# Patient Record
Sex: MALE | HISPANIC OR LATINO | URBAN - METROPOLITAN AREA
[De-identification: names, ages, dates, MRNs, and addresses within clinical notes are randomized per-mention and may not be internally consistent; named-entity substitution may affect disease eponyms.]

---

## 2017-03-25 ENCOUNTER — EMERGENCY (EMERGENCY)
Facility: HOSPITAL | Age: 27
LOS: 1 days | Discharge: HOME | End: 2017-03-25
Attending: EMERGENCY MEDICINE
Payer: SELF-PAY

## 2017-03-25 VITALS
HEIGHT: 65 IN | WEIGHT: 179.9 LBS | OXYGEN SATURATION: 96 % | RESPIRATION RATE: 20 BRPM | SYSTOLIC BLOOD PRESSURE: 134 MMHG | DIASTOLIC BLOOD PRESSURE: 81 MMHG | HEART RATE: 108 BPM | TEMPERATURE: 98 F

## 2017-03-25 VITALS
RESPIRATION RATE: 20 BRPM | SYSTOLIC BLOOD PRESSURE: 104 MMHG | OXYGEN SATURATION: 99 % | TEMPERATURE: 98 F | HEART RATE: 97 BPM | DIASTOLIC BLOOD PRESSURE: 68 MMHG

## 2017-03-25 DIAGNOSIS — R19.7 DIARRHEA, UNSPECIFIED: ICD-10-CM

## 2017-03-25 DIAGNOSIS — R10.84 GENERALIZED ABDOMINAL PAIN: ICD-10-CM

## 2017-03-25 DIAGNOSIS — F41.9 ANXIETY DISORDER, UNSPECIFIED: ICD-10-CM

## 2017-03-25 DIAGNOSIS — R07.89 OTHER CHEST PAIN: ICD-10-CM

## 2017-03-25 DIAGNOSIS — R11.2 NAUSEA WITH VOMITING, UNSPECIFIED: ICD-10-CM

## 2017-03-25 LAB
ALBUMIN SERPL ELPH-MCNC: 4.5 G/DL — SIGNIFICANT CHANGE UP (ref 3.3–5.2)
ALP SERPL-CCNC: 65 U/L — SIGNIFICANT CHANGE UP (ref 40–120)
ALT FLD-CCNC: 102 U/L — HIGH
ANION GAP SERPL CALC-SCNC: 13 MMOL/L — SIGNIFICANT CHANGE UP (ref 5–17)
AST SERPL-CCNC: 38 U/L — SIGNIFICANT CHANGE UP
BILIRUB SERPL-MCNC: 1.3 MG/DL — SIGNIFICANT CHANGE UP (ref 0.4–2)
BUN SERPL-MCNC: 12 MG/DL — SIGNIFICANT CHANGE UP (ref 8–20)
CALCIUM SERPL-MCNC: 8.7 MG/DL — SIGNIFICANT CHANGE UP (ref 8.6–10.2)
CHLORIDE SERPL-SCNC: 99 MMOL/L — SIGNIFICANT CHANGE UP (ref 98–107)
CK SERPL-CCNC: 85 U/L — SIGNIFICANT CHANGE UP (ref 30–200)
CO2 SERPL-SCNC: 27 MMOL/L — SIGNIFICANT CHANGE UP (ref 22–29)
CREAT SERPL-MCNC: 0.8 MG/DL — SIGNIFICANT CHANGE UP (ref 0.5–1.3)
EOSINOPHIL # BLD AUTO: 0.1 K/UL — SIGNIFICANT CHANGE UP (ref 0–0.5)
EOSINOPHIL NFR BLD AUTO: 0 % — SIGNIFICANT CHANGE UP (ref 0–5)
GLUCOSE SERPL-MCNC: 111 MG/DL — SIGNIFICANT CHANGE UP (ref 70–115)
HCT VFR BLD CALC: 47.4 % — SIGNIFICANT CHANGE UP (ref 42–52)
HGB BLD-MCNC: 16.1 G/DL — SIGNIFICANT CHANGE UP (ref 14–18)
LYMPHOCYTES # BLD AUTO: 0.7 K/UL — LOW (ref 1–4.8)
LYMPHOCYTES # BLD AUTO: 4 % — LOW (ref 20–55)
MCHC RBC-ENTMCNC: 31.8 PG — HIGH (ref 27–31)
MCHC RBC-ENTMCNC: 34 G/DL — SIGNIFICANT CHANGE UP (ref 32–36)
MCV RBC AUTO: 93.5 FL — SIGNIFICANT CHANGE UP (ref 80–94)
METAMYELOCYTES # FLD: 1 % — HIGH (ref 0–0)
MONOCYTES # BLD AUTO: 0.5 K/UL — SIGNIFICANT CHANGE UP (ref 0–0.8)
MONOCYTES NFR BLD AUTO: 3 % — SIGNIFICANT CHANGE UP (ref 3–10)
NEUTROPHILS # BLD AUTO: 12.4 K/UL — HIGH (ref 1.8–8)
NEUTROPHILS NFR BLD AUTO: 84 % — HIGH (ref 37–73)
NEUTS BAND # BLD: 8 % — SIGNIFICANT CHANGE UP (ref 0–8)
PLAT MORPH BLD: NORMAL — SIGNIFICANT CHANGE UP
PLATELET # BLD AUTO: 277 K/UL — SIGNIFICANT CHANGE UP (ref 150–400)
POTASSIUM SERPL-MCNC: 4.2 MMOL/L — SIGNIFICANT CHANGE UP (ref 3.5–5.3)
POTASSIUM SERPL-SCNC: 4.2 MMOL/L — SIGNIFICANT CHANGE UP (ref 3.5–5.3)
PROT SERPL-MCNC: 8.1 G/DL — SIGNIFICANT CHANGE UP (ref 6.6–8.7)
RBC # BLD: 5.07 M/UL — SIGNIFICANT CHANGE UP (ref 4.6–6.2)
RBC # FLD: 12.9 % — SIGNIFICANT CHANGE UP (ref 11–15.6)
RBC BLD AUTO: NORMAL — SIGNIFICANT CHANGE UP
SODIUM SERPL-SCNC: 139 MMOL/L — SIGNIFICANT CHANGE UP (ref 135–145)
TROPONIN T SERPL-MCNC: <0.01 NG/ML — SIGNIFICANT CHANGE UP (ref 0–0.06)
WBC # BLD: 13.8 K/UL — HIGH (ref 4.8–10.8)
WBC # FLD AUTO: 13.8 K/UL — HIGH (ref 4.8–10.8)

## 2017-03-25 PROCEDURE — 85027 COMPLETE CBC AUTOMATED: CPT

## 2017-03-25 PROCEDURE — 99053 MED SERV 10PM-8AM 24 HR FAC: CPT

## 2017-03-25 PROCEDURE — 93010 ELECTROCARDIOGRAM REPORT: CPT

## 2017-03-25 PROCEDURE — 84484 ASSAY OF TROPONIN QUANT: CPT

## 2017-03-25 PROCEDURE — 93005 ELECTROCARDIOGRAM TRACING: CPT

## 2017-03-25 PROCEDURE — 80053 COMPREHEN METABOLIC PANEL: CPT

## 2017-03-25 PROCEDURE — 99284 EMERGENCY DEPT VISIT MOD MDM: CPT | Mod: 25

## 2017-03-25 PROCEDURE — 99285 EMERGENCY DEPT VISIT HI MDM: CPT | Mod: 25

## 2017-03-25 PROCEDURE — 82550 ASSAY OF CK (CPK): CPT

## 2017-03-25 RX ORDER — ALPRAZOLAM 0.25 MG
0.25 TABLET ORAL ONCE
Qty: 0 | Refills: 0 | Status: DISCONTINUED | OUTPATIENT
Start: 2017-03-25 | End: 2017-03-25

## 2017-03-25 RX ORDER — FAMOTIDINE 10 MG/ML
20 INJECTION INTRAVENOUS ONCE
Qty: 0 | Refills: 0 | Status: COMPLETED | OUTPATIENT
Start: 2017-03-25 | End: 2017-03-25

## 2017-03-25 RX ORDER — ONDANSETRON 8 MG/1
4 TABLET, FILM COATED ORAL ONCE
Qty: 0 | Refills: 0 | Status: COMPLETED | OUTPATIENT
Start: 2017-03-25 | End: 2017-03-25

## 2017-03-25 RX ORDER — SODIUM CHLORIDE 9 MG/ML
1000 INJECTION INTRAMUSCULAR; INTRAVENOUS; SUBCUTANEOUS ONCE
Qty: 0 | Refills: 0 | Status: COMPLETED | OUTPATIENT
Start: 2017-03-25 | End: 2017-03-25

## 2017-03-25 RX ORDER — LIDOCAINE 4 G/100G
10 CREAM TOPICAL ONCE
Qty: 0 | Refills: 0 | Status: COMPLETED | OUTPATIENT
Start: 2017-03-25 | End: 2017-03-25

## 2017-03-25 RX ADMIN — FAMOTIDINE 20 MILLIGRAM(S): 10 INJECTION INTRAVENOUS at 08:14

## 2017-03-25 RX ADMIN — LIDOCAINE 10 MILLILITER(S): 4 CREAM TOPICAL at 08:13

## 2017-03-25 RX ADMIN — Medication 30 MILLILITER(S): at 08:13

## 2017-03-25 RX ADMIN — SODIUM CHLORIDE 1000 MILLILITER(S): 9 INJECTION INTRAMUSCULAR; INTRAVENOUS; SUBCUTANEOUS at 10:46

## 2017-03-25 RX ADMIN — Medication 0.25 MILLIGRAM(S): at 10:49

## 2017-03-25 RX ADMIN — ONDANSETRON 4 MILLIGRAM(S): 8 TABLET, FILM COATED ORAL at 08:16

## 2017-03-25 NOTE — ED PROVIDER NOTE - MEDICAL DECISION MAKING DETAILS
25 yo male with no pmhx presenting with acute onset of vomiting and diarrhea. Total of 5-6 episodes of vomiting and 1 episode of diarrhea. PO trial, Zofran for nausea. will re-evaluate as necessary.

## 2017-03-25 NOTE — ED PROVIDER NOTE - NS ED ATTENDING STATEMENT MOD
I have reviewed and agree with all pertinent clinical information, including history and physical exam and agree with treatment plan of the NP. I have personally performed a face to face diagnostic evaluation on this patient. I have reviewed the NP note and agree with the history, exam, and plan of care, except as noted.

## 2017-03-25 NOTE — ED ADULT NURSE NOTE - OBJECTIVE STATEMENT
AOX3 c/o abdominal pain that strated yesterday, pt states it was a sudden unset. pt c/o n/v, vomited 5/6 times last night. Denies any blood in the vomit, diarrhea.

## 2017-03-25 NOTE — ED PROVIDER NOTE - OBJECTIVE STATEMENT
25 yo male with no significant pmhx was well until last night. ate leftover cold  food including cooked chicken, beans and rice that was cooked later in the day and refrigerated. Pt ate at around 11pm and then went to bed At 12 am pt woke up with abdominal (points to epigastric region) pain, nausea, and feeling feverish/chills . pt then vomited and had 1 episode of diarrhea concurrently, and a total of 3-4 episodes of diarrhea. No one else who ate the meal has/had experienced the same symptoms.   Total of 5-6 episodes of vomiting in total since onset of symptoms. Pt tried drink tea earlier this morning but was unable to hold it down.   Denies any recent sick contacts, blood in stool, hx of abdominal disorders or surgeries. No radiation. Pt tried drinking vitamin water and took tums with only mild relief.

## 2017-03-25 NOTE — ED PROVIDER NOTE - ATTENDING CONTRIBUTION TO CARE
agree with NP Kranthi BOWEN/JAK.  pt with vomiting since last night after eating Mexican food.  pt well appearing.  will give meds, PO challenge and reeval

## 2017-03-25 NOTE — ED ADULT TRIAGE NOTE - CHIEF COMPLAINT QUOTE
Patient arrived to ED today with c/o abdominal pain and vomiting about 6 times in the past 12 hours.

## 2017-03-25 NOTE — ED PROVIDER NOTE - PROGRESS NOTE DETAILS
discussed po trial vs labs IV with patient. pt agrees to oral medications and hydration. pt remains nauseous after medications. will order basic labs, fluids, re-eval pt feeling slightly improved. beginning to sip water. will re-eval, if remains nauseous, will order basic labs, fluids, re-eval pt began to feel palpitations and chest discomfort feeling slightly short of breath. EKG ordered along with basic labs, cardiac enzymes, fluids, re-eval. pt began to feel palpitations and chest discomfort feeling slightly short of breath. EKG ordered along with basic labs, cardiac enzymes, fluids, re-eval.  History: Slightly suspicious =   0, EKG: Non-specific repolarization disturbance  +1  Age: < 45 =     0, Risk factors (Risk factors include: hypercholesterolemia, hypertension, diabetes mellitus, cigarette smoking, family history, obesity): No known risk factors  =  0, Troponin: normal limit = 0 pt feels improved. will dc.

## 2017-03-25 NOTE — ED PROVIDER NOTE - CARE PLAN
Principal Discharge DX:	Vomiting Principal Discharge DX:	Vomiting  Secondary Diagnosis:	Chest discomfort  Secondary Diagnosis:	Anxiety

## 2017-05-22 NOTE — ED PROVIDER NOTE - TIMING
----- Message from Fátima Hayden sent at 5/22/2017  4:36 PM CDT -----  Patient needs to reschedule his appointment on 6/23/17. Please call with availability at [phone].   sudden onset

## 2017-06-15 ENCOUNTER — EMERGENCY (EMERGENCY)
Facility: HOSPITAL | Age: 27
LOS: 1 days | Discharge: HOME | End: 2017-06-15
Attending: EMERGENCY MEDICINE
Payer: SELF-PAY

## 2017-06-15 VITALS
TEMPERATURE: 98 F | DIASTOLIC BLOOD PRESSURE: 82 MMHG | WEIGHT: 195.99 LBS | RESPIRATION RATE: 18 BRPM | SYSTOLIC BLOOD PRESSURE: 122 MMHG | HEART RATE: 84 BPM | OXYGEN SATURATION: 100 % | HEIGHT: 65 IN

## 2017-06-15 PROCEDURE — 99284 EMERGENCY DEPT VISIT MOD MDM: CPT

## 2017-06-15 PROCEDURE — 99285 EMERGENCY DEPT VISIT HI MDM: CPT

## 2017-06-15 RX ORDER — FAMOTIDINE 10 MG/ML
20 INJECTION INTRAVENOUS ONCE
Qty: 0 | Refills: 0 | Status: COMPLETED | OUTPATIENT
Start: 2017-06-15 | End: 2017-06-15

## 2017-06-15 RX ORDER — SODIUM CHLORIDE 9 MG/ML
1000 INJECTION INTRAMUSCULAR; INTRAVENOUS; SUBCUTANEOUS ONCE
Qty: 0 | Refills: 0 | Status: COMPLETED | OUTPATIENT
Start: 2017-06-15 | End: 2017-06-15

## 2017-06-15 RX ORDER — ONDANSETRON 8 MG/1
4 TABLET, FILM COATED ORAL ONCE
Qty: 0 | Refills: 0 | Status: COMPLETED | OUTPATIENT
Start: 2017-06-15 | End: 2017-06-15

## 2017-06-15 NOTE — ED PROVIDER NOTE - CONSTITUTIONAL, MLM
normal... Slightly uncoomfortable appearing, well nourished, awake, alert, oriented to person, place, time/situation and in no apparent distress.

## 2017-06-15 NOTE — ED PROVIDER NOTE - PROGRESS NOTE DETAILS
pt was seen and evaluated by surgery who cleared for d/c home after successful po challenge.  pt ate some cookies and requesting to go home.

## 2017-06-15 NOTE — ED PROVIDER NOTE - OBJECTIVE STATEMENT
25 y/o M presents to the ED c/o epigastric abdominal pain and vomiting that began this morning. He reports that his pain radiates up to his chest alongside a burning sensation. Pt states that he experienced similar sx recently, was evaluated at [facility] and dx with food poisoning. Denies fever, chills, diarrhea, consuming new/spoiled food. No further complaints at this time.

## 2017-06-16 VITALS
OXYGEN SATURATION: 98 % | DIASTOLIC BLOOD PRESSURE: 84 MMHG | RESPIRATION RATE: 16 BRPM | HEART RATE: 77 BPM | SYSTOLIC BLOOD PRESSURE: 136 MMHG | TEMPERATURE: 98 F

## 2017-06-16 DIAGNOSIS — K80.20 CALCULUS OF GALLBLADDER WITHOUT CHOLECYSTITIS WITHOUT OBSTRUCTION: ICD-10-CM

## 2017-06-16 LAB
ALBUMIN SERPL ELPH-MCNC: 4.6 G/DL — SIGNIFICANT CHANGE UP (ref 3.3–5.2)
ALP SERPL-CCNC: 64 U/L — SIGNIFICANT CHANGE UP (ref 40–120)
ALT FLD-CCNC: 115 U/L — HIGH
AMYLASE P1 CFR SERPL: 75 U/L — SIGNIFICANT CHANGE UP (ref 36–128)
ANION GAP SERPL CALC-SCNC: 14 MMOL/L — SIGNIFICANT CHANGE UP (ref 5–17)
AST SERPL-CCNC: 54 U/L — HIGH
BASOPHILS # BLD AUTO: 0 K/UL — SIGNIFICANT CHANGE UP (ref 0–0.2)
BASOPHILS NFR BLD AUTO: 0.1 % — SIGNIFICANT CHANGE UP (ref 0–2)
BILIRUB SERPL-MCNC: 1.2 MG/DL — SIGNIFICANT CHANGE UP (ref 0.4–2)
BUN SERPL-MCNC: 8 MG/DL — SIGNIFICANT CHANGE UP (ref 8–20)
CALCIUM SERPL-MCNC: 8.9 MG/DL — SIGNIFICANT CHANGE UP (ref 8.6–10.2)
CHLORIDE SERPL-SCNC: 99 MMOL/L — SIGNIFICANT CHANGE UP (ref 98–107)
CO2 SERPL-SCNC: 24 MMOL/L — SIGNIFICANT CHANGE UP (ref 22–29)
CREAT SERPL-MCNC: 0.66 MG/DL — SIGNIFICANT CHANGE UP (ref 0.5–1.3)
EOSINOPHIL # BLD AUTO: 0 K/UL — SIGNIFICANT CHANGE UP (ref 0–0.5)
EOSINOPHIL NFR BLD AUTO: 0.1 % — SIGNIFICANT CHANGE UP (ref 0–5)
GLUCOSE SERPL-MCNC: 114 MG/DL — SIGNIFICANT CHANGE UP (ref 70–115)
HCT VFR BLD CALC: 44.1 % — SIGNIFICANT CHANGE UP (ref 42–52)
HGB BLD-MCNC: 15.5 G/DL — SIGNIFICANT CHANGE UP (ref 14–18)
LIDOCAIN IGE QN: 47 U/L — SIGNIFICANT CHANGE UP (ref 22–51)
LYMPHOCYTES # BLD AUTO: 1.2 K/UL — SIGNIFICANT CHANGE UP (ref 1–4.8)
LYMPHOCYTES # BLD AUTO: 9.7 % — LOW (ref 20–55)
MCHC RBC-ENTMCNC: 31.7 PG — HIGH (ref 27–31)
MCHC RBC-ENTMCNC: 35.1 G/DL — SIGNIFICANT CHANGE UP (ref 32–36)
MCV RBC AUTO: 90.2 FL — SIGNIFICANT CHANGE UP (ref 80–94)
MONOCYTES # BLD AUTO: 0.4 K/UL — SIGNIFICANT CHANGE UP (ref 0–0.8)
MONOCYTES NFR BLD AUTO: 3.6 % — SIGNIFICANT CHANGE UP (ref 3–10)
NEUTROPHILS # BLD AUTO: 10.6 K/UL — HIGH (ref 1.8–8)
NEUTROPHILS NFR BLD AUTO: 86.3 % — HIGH (ref 37–73)
PLATELET # BLD AUTO: 275 K/UL — SIGNIFICANT CHANGE UP (ref 150–400)
POTASSIUM SERPL-MCNC: 3.9 MMOL/L — SIGNIFICANT CHANGE UP (ref 3.5–5.3)
POTASSIUM SERPL-SCNC: 3.9 MMOL/L — SIGNIFICANT CHANGE UP (ref 3.5–5.3)
PROT SERPL-MCNC: 8 G/DL — SIGNIFICANT CHANGE UP (ref 6.6–8.7)
RBC # BLD: 4.89 M/UL — SIGNIFICANT CHANGE UP (ref 4.6–6.2)
RBC # FLD: 12.6 % — SIGNIFICANT CHANGE UP (ref 11–15.6)
SODIUM SERPL-SCNC: 137 MMOL/L — SIGNIFICANT CHANGE UP (ref 135–145)
WBC # BLD: 12.3 K/UL — HIGH (ref 4.8–10.8)
WBC # FLD AUTO: 12.3 K/UL — HIGH (ref 4.8–10.8)

## 2017-06-16 PROCEDURE — 96374 THER/PROPH/DIAG INJ IV PUSH: CPT

## 2017-06-16 PROCEDURE — 99284 EMERGENCY DEPT VISIT MOD MDM: CPT | Mod: 25

## 2017-06-16 PROCEDURE — 76705 ECHO EXAM OF ABDOMEN: CPT

## 2017-06-16 PROCEDURE — 85027 COMPLETE CBC AUTOMATED: CPT

## 2017-06-16 PROCEDURE — 83690 ASSAY OF LIPASE: CPT

## 2017-06-16 PROCEDURE — 76705 ECHO EXAM OF ABDOMEN: CPT | Mod: 26

## 2017-06-16 PROCEDURE — 96375 TX/PRO/DX INJ NEW DRUG ADDON: CPT

## 2017-06-16 PROCEDURE — 82150 ASSAY OF AMYLASE: CPT

## 2017-06-16 PROCEDURE — 80053 COMPREHEN METABOLIC PANEL: CPT

## 2017-06-16 RX ADMIN — ONDANSETRON 4 MILLIGRAM(S): 8 TABLET, FILM COATED ORAL at 01:30

## 2017-06-16 RX ADMIN — FAMOTIDINE 20 MILLIGRAM(S): 10 INJECTION INTRAVENOUS at 01:30

## 2017-06-16 RX ADMIN — SODIUM CHLORIDE 1000 MILLILITER(S): 9 INJECTION INTRAMUSCULAR; INTRAVENOUS; SUBCUTANEOUS at 01:30

## 2017-06-16 NOTE — CONSULT NOTE ADULT - ASSESSMENT
27 y/o M w/ cholelithiasis. He has leukocytosis w/ left shift, however no pericholecystic fluid or gallbladder wall thickening on ultrasound, CBD is normal (4mm). Currently benign abdominal exam. ALT and AST are mildly elevated, Tbili is normal. He is afebrile and hemodynamically normal.

## 2017-06-16 NOTE — ED ADULT NURSE NOTE - OBJECTIVE STATEMENT
26 yr old male a+ox4 presents to ED c/o general abd pain and nausea.  pt denies vomiting, diarrhea, fevers, chills and sick contacts. no bloody emesis.   hx similar event when he was dx with food poisoning.  pt denies any PMH/PSH.

## 2017-06-16 NOTE — CONSULT NOTE ADULT - SUBJECTIVE AND OBJECTIVE BOX
INTERVAL HPI:  25 y/o M w/ PMH Gastritis comes to the ED w/ c/o vomiting and burning epigastric pain since one day ago. Around 2 pm yesterday he ate chinese food and then this morning he had burning epigastric pain and one vomiting episode. The pain radiates to the back, minimally relieved with TUMS. This is not the first time he has had this pain since he was seen two months ago for same c/o however he also had diarrhea in addition to epigastric burning pain he got from food poisoning. At this moment he denies diarrhea, dysuria, chest pain, palpitations, fevers or chills.     Allergies: NKDA  PMH: Gastritis  PSH: None  SH: Denies toxic habits      Vital Signs Last 24 Hrs  T(C): 36.8, Max: 36.8 (06-15 @ 21:52)  T(F): 98.2, Max: 98.2 (06-15 @ 21:52)  HR: 84 (84 - 84)  BP: 122/82 (122/82 - 122/82)  BP(mean): --  RR: 18 (18 - 18)  SpO2: 100% (100% - 100%)    Physical Exam:    Neurological:  No sensory/motor deficits    HEENT: PERRLA, EOMI, no drainage or redness    Neck: No bruits; no thyromegaly or nodules,  No JVD    Back: Normal spine flexure, No CVA tenderness, No deformity or limitation of movement    Respiratory: Breath Sounds equal & clear to auscultation, no accessory muscle use    Cardiovascular: Regular rate & rhythm, normal S1, S2; no murmurs, gallops or rubs    Gastrointestinal: He is soft, nondistended. Very mild RUQ tenderness. No rebound tenderness. No rigidity. Marrero's sign is negative. No masses palpable.     Extremities: No peripheral edema, No cyanosis, clubbing     Vascular: Equal and normal pulses: 2+ peripheral pulses throughout    Musculoskeletal: No joint pain, swelling or deformity; no limitation of movement    Skin: No rashes      I&O's Detail      LABS:                        15.5   12.3  )-----------( 275      ( 16 Jun 2017 01:46 )             44.1     06-16    137  |  99  |  8.0  ----------------------------<  114  3.9   |  24.0  |  0.66    Ca    8.9      16 Jun 2017 01:46    TPro  8.0  /  Alb  4.6  /  TBili  1.2  /  DBili  x   /  AST  54<H>  /  ALT  115<H>  /  AlkPhos  64  06-16          RADIOLOGY & ADDITIONAL STUDIES:

## 2017-06-16 NOTE — CONSULT NOTE ADULT - PROBLEM SELECTOR RECOMMENDATION 9
-Recommend PO trial, if he does not tolerate, will consider admission for laparoscopic cholecystectomy  -If he tolerates PO, he may follow up w/ [facility] to schedule for elective laparoscopic cholecystectomy

## 2017-07-30 ENCOUNTER — EMERGENCY (EMERGENCY)
Facility: HOSPITAL | Age: 27
LOS: 1 days | Discharge: HOME | End: 2017-07-30
Attending: EMERGENCY MEDICINE
Payer: SELF-PAY

## 2017-07-30 VITALS
HEART RATE: 86 BPM | SYSTOLIC BLOOD PRESSURE: 122 MMHG | WEIGHT: 184.97 LBS | RESPIRATION RATE: 20 BRPM | TEMPERATURE: 99 F | OXYGEN SATURATION: 99 % | DIASTOLIC BLOOD PRESSURE: 72 MMHG

## 2017-07-30 PROCEDURE — 99282 EMERGENCY DEPT VISIT SF MDM: CPT | Mod: 25

## 2017-07-30 PROCEDURE — 69200 CLEAR OUTER EAR CANAL: CPT | Mod: RT

## 2017-07-30 PROCEDURE — 69200 CLEAR OUTER EAR CANAL: CPT

## 2017-07-30 NOTE — ED STATDOCS - OBJECTIVE STATEMENT
28 y/o M pt presents to ED c/o right ear pain/foreign body x10 minutes. Pt reports he was wearing ear phones and when he went to take it out, a piece got stuck in his ear. Pt denies fever, chills, CP, SOB, nausea, vomiting, abdominal pain, headache, and visual changes. No further complaints at this time. NKDA.

## 2017-07-30 NOTE — ED STATDOCS - ENMT, MLM
Ear bud in right ear, removed with tweezers. TM wnl. soft rubber Ear bud cover in right ear, removed with tweezers. TM wnl.

## 2018-03-13 ENCOUNTER — INPATIENT (INPATIENT)
Facility: HOSPITAL | Age: 28
LOS: 2 days | Discharge: HOME | DRG: 872 | End: 2018-03-16
Attending: HOSPITALIST | Admitting: FAMILY MEDICINE
Payer: COMMERCIAL

## 2018-03-13 VITALS
SYSTOLIC BLOOD PRESSURE: 99 MMHG | RESPIRATION RATE: 22 BRPM | DIASTOLIC BLOOD PRESSURE: 60 MMHG | HEIGHT: 65 IN | WEIGHT: 244.93 LBS | OXYGEN SATURATION: 100 % | TEMPERATURE: 101 F | HEART RATE: 148 BPM

## 2018-03-13 LAB
ALBUMIN SERPL ELPH-MCNC: 2.9 G/DL — LOW (ref 3.3–5.2)
ALP SERPL-CCNC: 78 U/L — SIGNIFICANT CHANGE UP (ref 40–120)
ALT FLD-CCNC: 31 U/L — SIGNIFICANT CHANGE UP
ANION GAP SERPL CALC-SCNC: 15 MMOL/L — SIGNIFICANT CHANGE UP (ref 5–17)
ANION GAP SERPL CALC-SCNC: 16 MMOL/L — SIGNIFICANT CHANGE UP (ref 5–17)
APPEARANCE UR: CLEAR — SIGNIFICANT CHANGE UP
APTT BLD: 32.2 SEC — SIGNIFICANT CHANGE UP (ref 27.5–37.4)
AST SERPL-CCNC: 21 U/L — SIGNIFICANT CHANGE UP
BASOPHILS # BLD AUTO: 0 K/UL — SIGNIFICANT CHANGE UP (ref 0–0.2)
BASOPHILS NFR BLD AUTO: 0 % — SIGNIFICANT CHANGE UP (ref 0–2)
BILIRUB SERPL-MCNC: 1 MG/DL — SIGNIFICANT CHANGE UP (ref 0.4–2)
BILIRUB UR-MCNC: NEGATIVE — SIGNIFICANT CHANGE UP
BUN SERPL-MCNC: 11 MG/DL — SIGNIFICANT CHANGE UP (ref 8–20)
BUN SERPL-MCNC: 9 MG/DL — SIGNIFICANT CHANGE UP (ref 8–20)
CALCIUM SERPL-MCNC: 8.2 MG/DL — LOW (ref 8.6–10.2)
CALCIUM SERPL-MCNC: 8.6 MG/DL — SIGNIFICANT CHANGE UP (ref 8.6–10.2)
CHLORIDE SERPL-SCNC: 100 MMOL/L — SIGNIFICANT CHANGE UP (ref 98–107)
CHLORIDE SERPL-SCNC: 94 MMOL/L — LOW (ref 98–107)
CO2 SERPL-SCNC: 20 MMOL/L — LOW (ref 22–29)
CO2 SERPL-SCNC: 22 MMOL/L — SIGNIFICANT CHANGE UP (ref 22–29)
COLOR SPEC: YELLOW — SIGNIFICANT CHANGE UP
CREAT SERPL-MCNC: 0.77 MG/DL — SIGNIFICANT CHANGE UP (ref 0.5–1.3)
CREAT SERPL-MCNC: 0.85 MG/DL — SIGNIFICANT CHANGE UP (ref 0.5–1.3)
DIFF PNL FLD: NEGATIVE — SIGNIFICANT CHANGE UP
EOSINOPHIL # BLD AUTO: 0 K/UL — SIGNIFICANT CHANGE UP (ref 0–0.5)
EOSINOPHIL NFR BLD AUTO: 0 % — SIGNIFICANT CHANGE UP (ref 0–5)
EPI CELLS # UR: SIGNIFICANT CHANGE UP
GAS PNL BLDV: SIGNIFICANT CHANGE UP
GLUCOSE SERPL-MCNC: 86 MG/DL — SIGNIFICANT CHANGE UP (ref 70–115)
GLUCOSE SERPL-MCNC: 99 MG/DL — SIGNIFICANT CHANGE UP (ref 70–115)
GLUCOSE UR QL: NEGATIVE MG/DL — SIGNIFICANT CHANGE UP
HCO3 BLDV-SCNC: 25 MMOL/L — SIGNIFICANT CHANGE UP (ref 21–29)
HCT VFR BLD CALC: 34.9 % — LOW (ref 42–52)
HCT VFR BLD CALC: 36.5 % — LOW (ref 42–52)
HGB BLD-MCNC: 12 G/DL — LOW (ref 14–18)
HGB BLD-MCNC: 12.4 G/DL — LOW (ref 14–18)
INR BLD: 1.47 RATIO — HIGH (ref 0.88–1.16)
KETONES UR-MCNC: ABNORMAL
LACTATE BLDV-MCNC: 1 MMOL/L — SIGNIFICANT CHANGE UP (ref 0.5–2)
LACTATE BLDV-MCNC: 1.4 MMOL/L — SIGNIFICANT CHANGE UP (ref 0.5–2)
LEUKOCYTE ESTERASE UR-ACNC: ABNORMAL
LIDOCAIN IGE QN: 35 U/L — SIGNIFICANT CHANGE UP (ref 22–51)
LYMPHOCYTES # BLD AUTO: 0.2 K/UL — LOW (ref 1–4.8)
LYMPHOCYTES # BLD AUTO: 4 % — LOW (ref 20–55)
MANUAL DIF COMMENT BLD-IMP: SIGNIFICANT CHANGE UP
MCHC RBC-ENTMCNC: 30.7 PG — SIGNIFICANT CHANGE UP (ref 27–31)
MCHC RBC-ENTMCNC: 30.7 PG — SIGNIFICANT CHANGE UP (ref 27–31)
MCHC RBC-ENTMCNC: 34 G/DL — SIGNIFICANT CHANGE UP (ref 32–36)
MCHC RBC-ENTMCNC: 34.4 G/DL — SIGNIFICANT CHANGE UP (ref 32–36)
MCV RBC AUTO: 89.3 FL — SIGNIFICANT CHANGE UP (ref 80–94)
MCV RBC AUTO: 90.3 FL — SIGNIFICANT CHANGE UP (ref 80–94)
MONOCYTES # BLD AUTO: 0.4 K/UL — SIGNIFICANT CHANGE UP (ref 0–0.8)
MONOCYTES NFR BLD AUTO: 4 % — SIGNIFICANT CHANGE UP (ref 3–10)
NEUTROPHILS # BLD AUTO: 14.4 K/UL — HIGH (ref 1.8–8)
NEUTROPHILS NFR BLD AUTO: 83 % — HIGH (ref 37–73)
NEUTS BAND # BLD: 9 % — HIGH (ref 0–8)
NITRITE UR-MCNC: NEGATIVE — SIGNIFICANT CHANGE UP
PCO2 BLDV: 39 MMHG — SIGNIFICANT CHANGE UP (ref 35–50)
PH BLDV: 7.42 — SIGNIFICANT CHANGE UP (ref 7.32–7.43)
PH UR: 6 — SIGNIFICANT CHANGE UP (ref 5–8)
PLAT MORPH BLD: NORMAL — SIGNIFICANT CHANGE UP
PLATELET # BLD AUTO: 272 K/UL — SIGNIFICANT CHANGE UP (ref 150–400)
PLATELET # BLD AUTO: 300 K/UL — SIGNIFICANT CHANGE UP (ref 150–400)
PO2 BLDV: 51 MMHG — HIGH (ref 25–45)
POTASSIUM SERPL-MCNC: 3.8 MMOL/L — SIGNIFICANT CHANGE UP (ref 3.5–5.3)
POTASSIUM SERPL-MCNC: 4.3 MMOL/L — SIGNIFICANT CHANGE UP (ref 3.5–5.3)
POTASSIUM SERPL-SCNC: 3.8 MMOL/L — SIGNIFICANT CHANGE UP (ref 3.5–5.3)
POTASSIUM SERPL-SCNC: 4.3 MMOL/L — SIGNIFICANT CHANGE UP (ref 3.5–5.3)
PROT SERPL-MCNC: 6.4 G/DL — LOW (ref 6.6–8.7)
PROT UR-MCNC: 30 MG/DL
PROTHROM AB SERPL-ACNC: 16.3 SEC — HIGH (ref 9.8–12.7)
RAPID RVP RESULT: SIGNIFICANT CHANGE UP
RBC # BLD: 3.91 M/UL — LOW (ref 4.6–6.2)
RBC # BLD: 4.04 M/UL — LOW (ref 4.6–6.2)
RBC # FLD: 11.8 % — SIGNIFICANT CHANGE UP (ref 11–15.6)
RBC # FLD: 12.1 % — SIGNIFICANT CHANGE UP (ref 11–15.6)
RBC BLD AUTO: SIGNIFICANT CHANGE UP
S PYO AG SPEC QL IA: NEGATIVE — SIGNIFICANT CHANGE UP
SAO2 % BLDV: 87 % — SIGNIFICANT CHANGE UP
SODIUM SERPL-SCNC: 131 MMOL/L — LOW (ref 135–145)
SODIUM SERPL-SCNC: 136 MMOL/L — SIGNIFICANT CHANGE UP (ref 135–145)
SP GR SPEC: 1.01 — SIGNIFICANT CHANGE UP (ref 1.01–1.02)
UROBILINOGEN FLD QL: 4 MG/DL
WBC # BLD: 15.2 K/UL — HIGH (ref 4.8–10.8)
WBC # BLD: 8.6 K/UL — SIGNIFICANT CHANGE UP (ref 4.8–10.8)
WBC # FLD AUTO: 15.2 K/UL — HIGH (ref 4.8–10.8)
WBC # FLD AUTO: 8.6 K/UL — SIGNIFICANT CHANGE UP (ref 4.8–10.8)
WBC UR QL: SIGNIFICANT CHANGE UP

## 2018-03-13 PROCEDURE — 71045 X-RAY EXAM CHEST 1 VIEW: CPT | Mod: 26

## 2018-03-13 PROCEDURE — 74177 CT ABD & PELVIS W/CONTRAST: CPT | Mod: 26

## 2018-03-13 PROCEDURE — 99284 EMERGENCY DEPT VISIT MOD MDM: CPT

## 2018-03-13 PROCEDURE — 71250 CT THORAX DX C-: CPT | Mod: 26

## 2018-03-13 PROCEDURE — 76705 ECHO EXAM OF ABDOMEN: CPT | Mod: 26

## 2018-03-13 RX ORDER — CEFTRIAXONE 500 MG/1
1 INJECTION, POWDER, FOR SOLUTION INTRAMUSCULAR; INTRAVENOUS ONCE
Qty: 0 | Refills: 0 | Status: COMPLETED | OUTPATIENT
Start: 2018-03-13 | End: 2018-03-13

## 2018-03-13 RX ORDER — ACETAMINOPHEN 500 MG
650 TABLET ORAL ONCE
Qty: 0 | Refills: 0 | Status: COMPLETED | OUTPATIENT
Start: 2018-03-13 | End: 2018-03-13

## 2018-03-13 RX ORDER — SODIUM CHLORIDE 9 MG/ML
3000 INJECTION, SOLUTION INTRAVENOUS ONCE
Qty: 0 | Refills: 0 | Status: COMPLETED | OUTPATIENT
Start: 2018-03-13 | End: 2018-03-13

## 2018-03-13 RX ORDER — SODIUM CHLORIDE 9 MG/ML
3 INJECTION INTRAMUSCULAR; INTRAVENOUS; SUBCUTANEOUS ONCE
Qty: 0 | Refills: 0 | Status: COMPLETED | OUTPATIENT
Start: 2018-03-13 | End: 2018-03-13

## 2018-03-13 RX ORDER — IBUPROFEN 200 MG
600 TABLET ORAL ONCE
Qty: 0 | Refills: 0 | Status: COMPLETED | OUTPATIENT
Start: 2018-03-13 | End: 2018-03-13

## 2018-03-13 RX ADMIN — Medication 600 MILLIGRAM(S): at 17:21

## 2018-03-13 RX ADMIN — Medication 650 MILLIGRAM(S): at 17:21

## 2018-03-13 RX ADMIN — Medication 650 MILLIGRAM(S): at 11:57

## 2018-03-13 RX ADMIN — CEFTRIAXONE 100 GRAM(S): 500 INJECTION, POWDER, FOR SOLUTION INTRAMUSCULAR; INTRAVENOUS at 17:21

## 2018-03-13 RX ADMIN — Medication 650 MILLIGRAM(S): at 21:36

## 2018-03-13 RX ADMIN — SODIUM CHLORIDE 3 MILLILITER(S): 9 INJECTION INTRAMUSCULAR; INTRAVENOUS; SUBCUTANEOUS at 11:57

## 2018-03-13 RX ADMIN — SODIUM CHLORIDE 1500 MILLILITER(S): 9 INJECTION, SOLUTION INTRAVENOUS at 11:56

## 2018-03-13 NOTE — ED ADULT NURSE NOTE - OBJECTIVE STATEMENT
Assumed pt care at 1200.  Pt awake, alert and oriented x3 c/o palpitations and Tmax 104 at home wild sporadic cough.  Pt also c/o nausea when he eats, states he was recently diagnosed with gall stones but is unsure of what plan is regarding stones.  Respirations even and unlabored.  Denies chest pain.  Skin warm and dry.  Moving all extremities well and with purpose.  Sinus tach on cardiac monitor.

## 2018-03-13 NOTE — ED PROVIDER NOTE - PROGRESS NOTE DETAILS
PT WAS TO BE DISCHARGED AFTER IVF. FATHER CONCERNED BECAUSE PT IS SHAKING. WILL CHECK RECTAL TEMP AND SEND OFF RAPID STREP TEST. CXR AND URINE NEGATIVE FOR INFECTION. NEGATIVE FLU. NORMAL LABS NEGATIVE STREP TEST.  WILL DRAW CULTURES AND LACTATE   WILL GIVE IV AB. WILL TREAT FEVER. IV F AT 30 CC/KG ALREADY ORDERED SO CODE SEPSIS NOT CALLED OVER HEAD. CT CHEST , ABD AND PELVIS ORDERED TO DISCOVER ANY OCCULT INFECTION  PCP IS DR [facility] CONSULT CALLED  AWAITING CB FROM ATTENDING  DISCUSSED W/PA painc ontrolled questions answered called surgical consult regarding possibility cholecystitis and there input on case aware bp 90/50 mentation nml nml perfusion in nad will continue to follow closely

## 2018-03-13 NOTE — CONSULT NOTE ADULT - SUBJECTIVE AND OBJECTIVE BOX
Urology Attending:  Jonna       HPI:  27 year old male with no significant past medical history presented to the ED complaining of palpitations and RUQ pain. Patient was febrile to 105 on exam and CTAP demonstrated a fluid collection within the prostate gland measuring 3.2 x 2.4   cm. The prostate gland is enlarged. We were consulted due to suspicion of a prostatic abscess. Patient states he has not had any difficulty voiding, states his bm are regular and last movement was yesterday. He denies penile discharge, pain with urination, denies sexual activity. He states he has some mild RUQ pain non radiating. Denies nausea, vomiting, change in appetite.    PAST MEDICAL & SURGICAL HISTORY:  Gall stones  No significant past surgical history      REVIEW OF SYSTEMS      General:	    Skin/Breast:  	  Ophthalmologic:  	  ENMT:	    Respiratory and Thorax:  	  Cardiovascular:	+ palpitations     Gastrointestinal:	 + abdominal pain RUQ    Genitourinary:	    Musculoskeletal:	    Neurological:	    Psychiatric:	    Hematology/Lymphatics:	    Endocrine:	    Allergic/Immunologic:	        Allergies    No Known Allergies    Intolerances        SOCIAL HISTORY:      Vital Signs Last 24 Hrs  T(C): 38.6 (13 Mar 2018 18:47), Max: 40.6 (13 Mar 2018 16:57)  T(F): 101.4 (13 Mar 2018 18:47), Max: 105.1 (13 Mar 2018 16:57)  HR: 103 (13 Mar 2018 14:27) (103 - 121)  BP: 106/67 (13 Mar 2018 14:27) (99/60 - 106/67)  BP(mean): --  RR: 18 (13 Mar 2018 14:27) (18 - 22)  SpO2: 100% (13 Mar 2018 14:27) (100% - 100%)    PHYSICAL EXAM:      Constitutional: NAD, alert and oriented     Neck: supple, no JVD     Back: no cva tenderness     Respiratory: CTABL     Cardiovascular: s1s2, RRR    Gastrointestinal: soft, NT,ND,+BS, no guarding, no rebound     Genitourinary:    Rectal:    Extremities: warm, compartments soft, no calf pain    Neurological: grossly intact       LABS:                        12.4   8.6   )-----------( 300      ( 13 Mar 2018 12:10 )             36.5     03-13    131<L>  |  94<L>  |  11.0  ----------------------------<  86  4.3   |  22.0  |  0.85    Ca    8.6      13 Mar 2018 12:10        Urinalysis Basic - ( 13 Mar 2018 14:41 )    Color: Yellow / Appearance: Clear / S.010 / pH: x  Gluc: x / Ketone: Moderate  / Bili: Negative / Urobili: 4 mg/dL   Blood: x / Protein: 30 mg/dL / Nitrite: Negative   Leuk Esterase: Trace / RBC: x / WBC 0-2   Sq Epi: x / Non Sq Epi: Occasional / Bacteria: x        RADIOLOGY & ADDITIONAL STUDIES    EXAM: CT ABDOMEN AND PELVIS IC   EXAM: CT CHEST     PROCEDURE DATE: 2018         INTERPRETATION: CT CHEST WITHOUT CONTRAST:     HISTORY: Fever of unknown origin.     Date and Time of Exam: 3/13/2018 6:08 PM     TECHNIQUE: Sections were obtained from the apices to the diaphragm without   intravenous contrast.     COMPARISON EXAMINATION: No prior exam.     FINDINGS: No evidence of mediastinal, hilar, or axillary lymphadenopathy. No   evidence of pleural or pericardial effusion.     The lungs are clear.     No significant osseous abnormality.     IMPRESSION:   Unremarkable CT scan of the chest.         CT ABDOMEN AND PELVIS WITH CONTRAST:     HISTORY: Fever of unknown origin.     TECHNIQUE: Sections were obtained from the diaphragm to the pubic symphysis   following oral and intravenous contrast. 95 mls of omnipaque 300 were   administered intravenously without complication.     COMPARISON: No prior exam.     FINDINGS:   The liver is unremarkable. There is diffuse thickening of the gallbladder   wall which is contracted. Cholelithiasis is noted. No definite evidence of   pericholecystic inflammation.     The spleen is not enlarged and demonstrates no focal abnormality. The   pancreatic contour is unremarkable without evidence of mass, inflammation or   ductal dilatation. The adrenal glands demonstrate normal size and contour.     The kidneys reveal a normal enhanced morphology. No evidence of   retroperitoneal or pelvic lymphadenopathy.     There is a fluid collection within the prostate gland measuring 3.2 x 2.4   cm. The prostate gland is enlarged. The seminal vesicles are unremarkable.   The bladder demonstrates no abnormality. Inflammatory changes are noted   surrounding the prostate gland.     The colon is unremarkable. The appendix is visualized and is normal.     No significant osseous abnormality..     IMPRESSION:     Fluid collection within the prostate gland with surrounding inflammatory   changes. Findings are suspicious for a prostatic abscess.     Cholelithiasis and thickening of the gallbladder wall without   pericholecystic inflammation or fluid.       Impression and Plan:    27 year old male febrile, tachycardic with a 3.2 X 2.4 cm fluid collection and enlarged prostate on CT.     -Case reviewed and discussed with urology attending further evaluation and recommendations to follow Urology Attending:  Jonna       HPI:  27 year old male with no significant past medical history presented to the ED complaining of palpitations and RUQ pain. Patient was febrile to 105 on exam and CTAP demonstrated a fluid collection within the prostate gland measuring 3.2 x 2.4   cm. The prostate gland is enlarged. We were consulted due to suspicion of a prostatic abscess. Patient states he has not had any difficulty voiding, states his bm are regular and last movement was yesterday. He denies penile discharge, pain with urination, denies sexual activity. He states he has some mild RUQ pain non radiating. Denies nausea, vomiting, change in appetite.    PAST MEDICAL & SURGICAL HISTORY:  Gall stones  No significant past surgical history      REVIEW OF SYSTEMS      General:	    Skin/Breast:  	  Ophthalmologic:  	  ENMT:	    Respiratory and Thorax:  	  Cardiovascular:	+ palpitations     Gastrointestinal:	 + abdominal pain RUQ    Genitourinary:	    Musculoskeletal:	    Neurological:	    Psychiatric:	    Hematology/Lymphatics:	    Endocrine:	    Allergic/Immunologic:	        Allergies    No Known Allergies    Intolerances        SOCIAL HISTORY:      Vital Signs Last 24 Hrs  T(C): 38.6 (13 Mar 2018 18:47), Max: 40.6 (13 Mar 2018 16:57)  T(F): 101.4 (13 Mar 2018 18:47), Max: 105.1 (13 Mar 2018 16:57)  HR: 103 (13 Mar 2018 14:27) (103 - 121)  BP: 106/67 (13 Mar 2018 14:27) (99/60 - 106/67)  BP(mean): --  RR: 18 (13 Mar 2018 14:27) (18 - 22)  SpO2: 100% (13 Mar 2018 14:27) (100% - 100%)    PHYSICAL EXAM:      Constitutional: NAD, alert and oriented     Neck: supple, no JVD     Back: no cva tenderness     Respiratory: CTABL     Cardiovascular: s1s2, RRR    Gastrointestinal: soft, NT,ND,+BS, no guarding, no rebound     Genitourinary: uncircumsized, no urethral discharge, testicles distended, non tender    Rectal: normal tone, prostate smooth     Extremities: warm, compartments soft, no calf pain    Neurological: grossly intact       LABS:                        12.4   8.6   )-----------( 300      ( 13 Mar 2018 12:10 )             36.5     03-13    131<L>  |  94<L>  |  11.0  ----------------------------<  86  4.3   |  22.0  |  0.85    Ca    8.6      13 Mar 2018 12:10        Urinalysis Basic - ( 13 Mar 2018 14:41 )    Color: Yellow / Appearance: Clear / S.010 / pH: x  Gluc: x / Ketone: Moderate  / Bili: Negative / Urobili: 4 mg/dL   Blood: x / Protein: 30 mg/dL / Nitrite: Negative   Leuk Esterase: Trace / RBC: x / WBC 0-2   Sq Epi: x / Non Sq Epi: Occasional / Bacteria: x        RADIOLOGY & ADDITIONAL STUDIES    EXAM: CT ABDOMEN AND PELVIS IC   EXAM: CT CHEST     PROCEDURE DATE: 2018         INTERPRETATION: CT CHEST WITHOUT CONTRAST:     HISTORY: Fever of unknown origin.     Date and Time of Exam: 3/13/2018 6:08 PM     TECHNIQUE: Sections were obtained from the apices to the diaphragm without   intravenous contrast.     COMPARISON EXAMINATION: No prior exam.     FINDINGS: No evidence of mediastinal, hilar, or axillary lymphadenopathy. No   evidence of pleural or pericardial effusion.     The lungs are clear.     No significant osseous abnormality.     IMPRESSION:   Unremarkable CT scan of the chest.         CT ABDOMEN AND PELVIS WITH CONTRAST:     HISTORY: Fever of unknown origin.     TECHNIQUE: Sections were obtained from the diaphragm to the pubic symphysis   following oral and intravenous contrast. 95 mls of omnipaque 300 were   administered intravenously without complication.     COMPARISON: No prior exam.     FINDINGS:   The liver is unremarkable. There is diffuse thickening of the gallbladder   wall which is contracted. Cholelithiasis is noted. No definite evidence of   pericholecystic inflammation.     The spleen is not enlarged and demonstrates no focal abnormality. The   pancreatic contour is unremarkable without evidence of mass, inflammation or   ductal dilatation. The adrenal glands demonstrate normal size and contour.     The kidneys reveal a normal enhanced morphology. No evidence of   retroperitoneal or pelvic lymphadenopathy.     There is a fluid collection within the prostate gland measuring 3.2 x 2.4   cm. The prostate gland is enlarged. The seminal vesicles are unremarkable.   The bladder demonstrates no abnormality. Inflammatory changes are noted   surrounding the prostate gland.     The colon is unremarkable. The appendix is visualized and is normal.     No significant osseous abnormality..     IMPRESSION:     Fluid collection within the prostate gland with surrounding inflammatory   changes. Findings are suspicious for a prostatic abscess.     Cholelithiasis and thickening of the gallbladder wall without   pericholecystic inflammation or fluid.       Impression and Plan:    27 year old male febrile, tachycardic with a 3.2 X 2.4 cm fluid collection and enlarged prostate on CT.     -Case reviewed and discussed with urology attending further evaluation and recommendations to follow Urology Attending:  Jonna       HPI:  27 year old male with no significant past medical history presented to the ED complaining of palpitations and RUQ pain. Patient was febrile to 105 on exam and CTAP demonstrated a fluid collection within the prostate gland measuring 3.2 x 2.4 cm.We were consulted due to suspicion of a prostatic abscess. Patient states he has not had any difficulty voiding, states his bm are regular and last movement was yesterday. He denies penile discharge, pain with urination, denies sexual activity. He states he has some mild RUQ pain non radiating. Denies nausea, vomiting, change in appetite.    PAST MEDICAL & SURGICAL HISTORY:  Gall stones  No significant past surgical history      REVIEW OF SYSTEMS      General:	    Skin/Breast:  	  Ophthalmologic:  	  ENMT:	    Respiratory and Thorax:  	  Cardiovascular:	+ palpitations     Gastrointestinal:	 + abdominal pain RUQ    Genitourinary:	    Musculoskeletal:	    Neurological:	    Psychiatric:	    Hematology/Lymphatics:	    Endocrine:	    Allergic/Immunologic:	        Allergies    No Known Allergies    Intolerances        SOCIAL HISTORY:      Vital Signs Last 24 Hrs  T(C): 38.6 (13 Mar 2018 18:47), Max: 40.6 (13 Mar 2018 16:57)  T(F): 101.4 (13 Mar 2018 18:47), Max: 105.1 (13 Mar 2018 16:57)  HR: 103 (13 Mar 2018 14:27) (103 - 121)  BP: 106/67 (13 Mar 2018 14:27) (99/60 - 106/67)  BP(mean): --  RR: 18 (13 Mar 2018 14:27) (18 - 22)  SpO2: 100% (13 Mar 2018 14:27) (100% - 100%)    PHYSICAL EXAM:      Constitutional: NAD, alert and oriented     Neck: supple, no JVD     Back: no cva tenderness     Respiratory: CTABL     Cardiovascular: s1s2, RRR    Gastrointestinal: soft, NT,ND,+BS, no guarding, no rebound     Genitourinary: uncircumsized, no urethral discharge, testicles distended, non tender    Rectal: normal tone, enlarged asymmetric prostate non tender     Extremities: warm, compartments soft, no calf pain    Neurological: grossly intact       LABS:                        12.4   8.6   )-----------( 300      ( 13 Mar 2018 12:10 )             36.5     03-13    131<L>  |  94<L>  |  11.0  ----------------------------<  86  4.3   |  22.0  |  0.85    Ca    8.6      13 Mar 2018 12:10        Urinalysis Basic - ( 13 Mar 2018 14:41 )    Color: Yellow / Appearance: Clear / S.010 / pH: x  Gluc: x / Ketone: Moderate  / Bili: Negative / Urobili: 4 mg/dL   Blood: x / Protein: 30 mg/dL / Nitrite: Negative   Leuk Esterase: Trace / RBC: x / WBC 0-2   Sq Epi: x / Non Sq Epi: Occasional / Bacteria: x        RADIOLOGY & ADDITIONAL STUDIES    EXAM: CT ABDOMEN AND PELVIS IC   EXAM: CT CHEST     PROCEDURE DATE: 2018         INTERPRETATION: CT CHEST WITHOUT CONTRAST:     HISTORY: Fever of unknown origin.     Date and Time of Exam: 3/13/2018 6:08 PM     TECHNIQUE: Sections were obtained from the apices to the diaphragm without   intravenous contrast.     COMPARISON EXAMINATION: No prior exam.     FINDINGS: No evidence of mediastinal, hilar, or axillary lymphadenopathy. No   evidence of pleural or pericardial effusion.     The lungs are clear.     No significant osseous abnormality.     IMPRESSION:   Unremarkable CT scan of the chest.         CT ABDOMEN AND PELVIS WITH CONTRAST:     HISTORY: Fever of unknown origin.     TECHNIQUE: Sections were obtained from the diaphragm to the pubic symphysis   following oral and intravenous contrast. 95 mls of omnipaque 300 were   administered intravenously without complication.     COMPARISON: No prior exam.     FINDINGS:   The liver is unremarkable. There is diffuse thickening of the gallbladder   wall which is contracted. Cholelithiasis is noted. No definite evidence of   pericholecystic inflammation.     The spleen is not enlarged and demonstrates no focal abnormality. The   pancreatic contour is unremarkable without evidence of mass, inflammation or   ductal dilatation. The adrenal glands demonstrate normal size and contour.     The kidneys reveal a normal enhanced morphology. No evidence of   retroperitoneal or pelvic lymphadenopathy.     There is a fluid collection within the prostate gland measuring 3.2 x 2.4   cm. The prostate gland is enlarged. The seminal vesicles are unremarkable.   The bladder demonstrates no abnormality. Inflammatory changes are noted   surrounding the prostate gland.     The colon is unremarkable. The appendix is visualized and is normal.     No significant osseous abnormality..     IMPRESSION:     Fluid collection within the prostate gland with surrounding inflammatory   changes. Findings are suspicious for a prostatic abscess.     Cholelithiasis and thickening of the gallbladder wall without   pericholecystic inflammation or fluid.       Impression and Plan:    27 year old male febrile, tachycardic with a 3.2 X 2.4 cm fluid collection and enlarged prostate on CT.     -Case reviewed and discussed with urology attending further evaluation and recommendations to follow Urology Attending:  Jonna       HPI:  27 year old male with no significant past medical history presented to the ED complaining of palpitations and RUQ pain. Patient was febrile to 105 on exam and CTAP demonstrated a fluid collection within the prostate gland measuring 3.2 x 2.4 cm.We were consulted due to suspicion of a prostatic abscess. Patient states he has not had any difficulty voiding, states his bm are regular and last movement was yesterday. He denies penile discharge, pain with urination, denies sexual activity. He states he has some mild RUQ pain non radiating. Denies nausea, vomiting, change in appetite.    PAST MEDICAL & SURGICAL HISTORY:  Gall stones  No significant past surgical history      REVIEW OF SYSTEMS      General:	    Skin/Breast:  	  Ophthalmologic:  	  ENMT:	    Respiratory and Thorax:  	  Cardiovascular:	+ palpitations     Gastrointestinal:	 + abdominal pain RUQ    Genitourinary:	    Musculoskeletal:	    Neurological:	    Psychiatric:	    Hematology/Lymphatics:	    Endocrine:	    Allergic/Immunologic:	        Allergies    No Known Allergies    Intolerances        SOCIAL HISTORY:      Vital Signs Last 24 Hrs  T(C): 38.6 (13 Mar 2018 18:47), Max: 40.6 (13 Mar 2018 16:57)  T(F): 101.4 (13 Mar 2018 18:47), Max: 105.1 (13 Mar 2018 16:57)  HR: 103 (13 Mar 2018 14:27) (103 - 121)  BP: 106/67 (13 Mar 2018 14:27) (99/60 - 106/67)  BP(mean): --  RR: 18 (13 Mar 2018 14:27) (18 - 22)  SpO2: 100% (13 Mar 2018 14:27) (100% - 100%)    PHYSICAL EXAM:      Constitutional: NAD, alert and oriented     Neck: supple, no JVD     Back: no cva tenderness     Respiratory: CTABL     Cardiovascular: s1s2, RRR    Gastrointestinal: soft, NT,ND,+BS, no guarding, no rebound     Genitourinary: uncircumsized, no urethral discharge, testicles distended, non tender    Rectal: normal tone, enlarged asymmetric prostate non tender     Extremities: warm, compartments soft, no calf pain    Neurological: grossly intact       LABS:                        12.4   8.6   )-----------( 300      ( 13 Mar 2018 12:10 )             36.5     03-13    131<L>  |  94<L>  |  11.0  ----------------------------<  86  4.3   |  22.0  |  0.85    Ca    8.6      13 Mar 2018 12:10        Urinalysis Basic - ( 13 Mar 2018 14:41 )    Color: Yellow / Appearance: Clear / S.010 / pH: x  Gluc: x / Ketone: Moderate  / Bili: Negative / Urobili: 4 mg/dL   Blood: x / Protein: 30 mg/dL / Nitrite: Negative   Leuk Esterase: Trace / RBC: x / WBC 0-2   Sq Epi: x / Non Sq Epi: Occasional / Bacteria: x        RADIOLOGY & ADDITIONAL STUDIES    EXAM: CT ABDOMEN AND PELVIS IC   EXAM: CT CHEST     PROCEDURE DATE: 2018         INTERPRETATION: CT CHEST WITHOUT CONTRAST:     HISTORY: Fever of unknown origin.     Date and Time of Exam: 3/13/2018 6:08 PM     TECHNIQUE: Sections were obtained from the apices to the diaphragm without   intravenous contrast.     COMPARISON EXAMINATION: No prior exam.     FINDINGS: No evidence of mediastinal, hilar, or axillary lymphadenopathy. No   evidence of pleural or pericardial effusion.     The lungs are clear.     No significant osseous abnormality.     IMPRESSION:   Unremarkable CT scan of the chest.         CT ABDOMEN AND PELVIS WITH CONTRAST:     HISTORY: Fever of unknown origin.     TECHNIQUE: Sections were obtained from the diaphragm to the pubic symphysis   following oral and intravenous contrast. 95 mls of omnipaque 300 were   administered intravenously without complication.     COMPARISON: No prior exam.     FINDINGS:   The liver is unremarkable. There is diffuse thickening of the gallbladder   wall which is contracted. Cholelithiasis is noted. No definite evidence of   pericholecystic inflammation.     The spleen is not enlarged and demonstrates no focal abnormality. The   pancreatic contour is unremarkable without evidence of mass, inflammation or   ductal dilatation. The adrenal glands demonstrate normal size and contour.     The kidneys reveal a normal enhanced morphology. No evidence of   retroperitoneal or pelvic lymphadenopathy.     There is a fluid collection within the prostate gland measuring 3.2 x 2.4   cm. The prostate gland is enlarged. The seminal vesicles are unremarkable.   The bladder demonstrates no abnormality. Inflammatory changes are noted   surrounding the prostate gland.     The colon is unremarkable. The appendix is visualized and is normal.     No significant osseous abnormality..     IMPRESSION:     Fluid collection within the prostate gland with surrounding inflammatory   changes. Findings are suspicious for a prostatic abscess.     Cholelithiasis and thickening of the gallbladder wall without   pericholecystic inflammation or fluid.       Impression and Plan:    27 year old male febrile, tachycardic with a 3.2 X 2.4 cm fluid collection and enlarged prostate on CT.     -Case reviewed and discussed with urology attending further evaluation and recommendations to follow  Recommend patient be NPO   Repeat Urine studies Urology Attending:  Jonna       HPI:  27 year old male with no significant past medical history presented to the ED complaining of palpitations and RUQ pain. Patient was febrile to 105 on exam and CTAP demonstrated a fluid collection within the prostate gland measuring 3.2 x 2.4 cm.We were consulted due to suspicion of a prostatic abscess. Patient states he has not had any difficulty voiding, states his bm are regular and last movement was yesterday. He denies penile discharge, pain with urination, denies sexual activity. He states he has some mild RUQ pain non radiating. Denies nausea, vomiting, change in appetite.    PAST MEDICAL & SURGICAL HISTORY:  Gall stones  No significant past surgical history      REVIEW OF SYSTEMS      General:	    Skin/Breast:  	  Ophthalmologic:  	  ENMT:	    Respiratory and Thorax:  	  Cardiovascular:	+ palpitations     Gastrointestinal:	 + abdominal pain RUQ    Genitourinary:	    Musculoskeletal:	    Neurological:	    Psychiatric:	    Hematology/Lymphatics:	    Endocrine:	    Allergic/Immunologic:	        Allergies    No Known Allergies    Intolerances        SOCIAL HISTORY:      Vital Signs Last 24 Hrs  T(C): 38.6 (13 Mar 2018 18:47), Max: 40.6 (13 Mar 2018 16:57)  T(F): 101.4 (13 Mar 2018 18:47), Max: 105.1 (13 Mar 2018 16:57)  HR: 103 (13 Mar 2018 14:27) (103 - 121)  BP: 106/67 (13 Mar 2018 14:27) (99/60 - 106/67)  BP(mean): --  RR: 18 (13 Mar 2018 14:27) (18 - 22)  SpO2: 100% (13 Mar 2018 14:27) (100% - 100%)    PHYSICAL EXAM:      Constitutional: NAD, alert and oriented     Neck: supple, no JVD     Back: no cva tenderness     Respiratory: CTABL     Cardiovascular: s1s2, RRR    Gastrointestinal: soft, NT,ND,+BS, no guarding, no rebound     Genitourinary: uncircumsized, no urethral discharge, testicles distended, non tender    Rectal: normal tone, enlarged asymmetric prostate non tender     Extremities: warm, compartments soft, no calf pain    Neurological: grossly intact       LABS:                        12.4   8.6   )-----------( 300      ( 13 Mar 2018 12:10 )             36.5     03-13    131<L>  |  94<L>  |  11.0  ----------------------------<  86  4.3   |  22.0  |  0.85    Ca    8.6      13 Mar 2018 12:10        Urinalysis Basic - ( 13 Mar 2018 14:41 )    Color: Yellow / Appearance: Clear / S.010 / pH: x  Gluc: x / Ketone: Moderate  / Bili: Negative / Urobili: 4 mg/dL   Blood: x / Protein: 30 mg/dL / Nitrite: Negative   Leuk Esterase: Trace / RBC: x / WBC 0-2   Sq Epi: x / Non Sq Epi: Occasional / Bacteria: x        RADIOLOGY & ADDITIONAL STUDIES    EXAM: CT ABDOMEN AND PELVIS IC   EXAM: CT CHEST     PROCEDURE DATE: 2018         INTERPRETATION: CT CHEST WITHOUT CONTRAST:     HISTORY: Fever of unknown origin.     Date and Time of Exam: 3/13/2018 6:08 PM     TECHNIQUE: Sections were obtained from the apices to the diaphragm without   intravenous contrast.     COMPARISON EXAMINATION: No prior exam.     FINDINGS: No evidence of mediastinal, hilar, or axillary lymphadenopathy. No   evidence of pleural or pericardial effusion.     The lungs are clear.     No significant osseous abnormality.     IMPRESSION:   Unremarkable CT scan of the chest.         CT ABDOMEN AND PELVIS WITH CONTRAST:     HISTORY: Fever of unknown origin.     TECHNIQUE: Sections were obtained from the diaphragm to the pubic symphysis   following oral and intravenous contrast. 95 mls of omnipaque 300 were   administered intravenously without complication.     COMPARISON: No prior exam.     FINDINGS:   The liver is unremarkable. There is diffuse thickening of the gallbladder   wall which is contracted. Cholelithiasis is noted. No definite evidence of   pericholecystic inflammation.     The spleen is not enlarged and demonstrates no focal abnormality. The   pancreatic contour is unremarkable without evidence of mass, inflammation or   ductal dilatation. The adrenal glands demonstrate normal size and contour.     The kidneys reveal a normal enhanced morphology. No evidence of   retroperitoneal or pelvic lymphadenopathy.     There is a fluid collection within the prostate gland measuring 3.2 x 2.4   cm. The prostate gland is enlarged. The seminal vesicles are unremarkable.   The bladder demonstrates no abnormality. Inflammatory changes are noted   surrounding the prostate gland.     The colon is unremarkable. The appendix is visualized and is normal.     No significant osseous abnormality..     IMPRESSION:     Fluid collection within the prostate gland with surrounding inflammatory   changes. Findings are suspicious for a prostatic abscess.     Cholelithiasis and thickening of the gallbladder wall without   pericholecystic inflammation or fluid.       Impression and Plan:    27 year old male febrile, tachycardic with a 3.2 X 2.4 cm fluid collection and enlarged prostate on CT.     -Case reviewed and discussed with urology attending further evaluation and recommendations to follow  Recommend ID consult for further evaluation   Recommend patient be NPO   Repeat Urine studies  Urology will continue to follow patient during admission

## 2018-03-13 NOTE — ED PROVIDER NOTE - OBJECTIVE STATEMENT
PT PRESENTS WITH FLU LIKE SYMPTOMS. PT STATES TWO WEEKS AGO HE WAS DIAGNOSED WITH GALL BLADDER DISEASE.  2 DAYS AGO HE HAD PALPITATIONS. TODAY HE HAD HIGH FEVER AND CAME TO THE ER FOR EVALUATION.  HE ADMITS TO OCCASIONAL COUGH. HE STATES WHEN HE COUGHS HE HAS CHEST WALL PAIN. NO OTHER SYMPTOMS  MED HX Gall stones

## 2018-03-13 NOTE — ED PROVIDER NOTE - ENMT, MLM
Airway patent, Mouth with normal mucosa. Throat has no vesicles, no oropharyngeal exudates and uvula is midline. TMS NORMAL LR X 2 + WAX BIALTERALLY

## 2018-03-13 NOTE — ED PROVIDER NOTE - MEDICAL DECISION MAKING DETAILS
FEVER NOT FEELING WELL. W/U NEG PNUMONIA, FLU UTI  IVF HYDRATION AND ANTIPYRETICS FEVER NOT FEELING WELL. W/U NEG PNEUMONIA, -FLU -UTI  IVF HYDRATION AND ANTIPYRETICS  CT + PROSTATE ABSCESS NEED URO, ADMIT IV AB

## 2018-03-13 NOTE — ED ADULT TRIAGE NOTE - CHIEF COMPLAINT QUOTE
Patient brought in by ambulance A/Ox3, ambulatory into ED, states fever, chills, and heart palpations that started 6 hours ago.  Patient tachycardiac 148. Patient brought in by ambulance A/Ox3, ambulatory into ED, states fever, chills, and heart palpations that started 6 hours ago.  Patient tachycardiac 121.

## 2018-03-13 NOTE — ED ADULT NURSE NOTE - CHIEF COMPLAINT QUOTE
Patient brought in by ambulance A/Ox3, ambulatory into ED, states fever, chills, and heart palpations that started 6 hours ago.  Patient tachycardiac 121.

## 2018-03-14 DIAGNOSIS — Z29.9 ENCOUNTER FOR PROPHYLACTIC MEASURES, UNSPECIFIED: ICD-10-CM

## 2018-03-14 DIAGNOSIS — R51 HEADACHE: ICD-10-CM

## 2018-03-14 DIAGNOSIS — N41.2 ABSCESS OF PROSTATE: ICD-10-CM

## 2018-03-14 DIAGNOSIS — K80.20 CALCULUS OF GALLBLADDER WITHOUT CHOLECYSTITIS WITHOUT OBSTRUCTION: ICD-10-CM

## 2018-03-14 DIAGNOSIS — A41.9 SEPSIS, UNSPECIFIED ORGANISM: ICD-10-CM

## 2018-03-14 DIAGNOSIS — Z98.890 OTHER SPECIFIED POSTPROCEDURAL STATES: Chronic | ICD-10-CM

## 2018-03-14 LAB
ABO RH CONFIRMATION: SIGNIFICANT CHANGE UP
ANION GAP SERPL CALC-SCNC: 14 MMOL/L — SIGNIFICANT CHANGE UP (ref 5–17)
ANISOCYTOSIS BLD QL: SLIGHT — SIGNIFICANT CHANGE UP
APPEARANCE UR: CLEAR — SIGNIFICANT CHANGE UP
BACTERIA # UR AUTO: ABNORMAL
BILIRUB UR-MCNC: NEGATIVE — SIGNIFICANT CHANGE UP
BLD GP AB SCN SERPL QL: SIGNIFICANT CHANGE UP
BUN SERPL-MCNC: 8 MG/DL — SIGNIFICANT CHANGE UP (ref 8–20)
CALCIUM SERPL-MCNC: 7.7 MG/DL — LOW (ref 8.6–10.2)
CHLORIDE SERPL-SCNC: 101 MMOL/L — SIGNIFICANT CHANGE UP (ref 98–107)
CO2 SERPL-SCNC: 21 MMOL/L — LOW (ref 22–29)
COLOR SPEC: YELLOW — SIGNIFICANT CHANGE UP
CREAT SERPL-MCNC: 0.66 MG/DL — SIGNIFICANT CHANGE UP (ref 0.5–1.3)
DIFF PNL FLD: ABNORMAL
EPI CELLS # UR: SIGNIFICANT CHANGE UP
GLUCOSE SERPL-MCNC: 79 MG/DL — SIGNIFICANT CHANGE UP (ref 70–115)
GLUCOSE UR QL: NEGATIVE MG/DL — SIGNIFICANT CHANGE UP
HAV IGM SER-ACNC: SIGNIFICANT CHANGE UP
HBV CORE IGM SER-ACNC: SIGNIFICANT CHANGE UP
HBV SURFACE AG SER-ACNC: SIGNIFICANT CHANGE UP
HCT VFR BLD CALC: 34.3 % — LOW (ref 42–52)
HCV AB S/CO SERPL IA: 0.67 S/CO — SIGNIFICANT CHANGE UP
HCV AB SERPL-IMP: SIGNIFICANT CHANGE UP
HGB BLD-MCNC: 11.6 G/DL — LOW (ref 14–18)
HIV 1 & 2 AB SERPL IA.RAPID: SIGNIFICANT CHANGE UP
HYPOCHROMIA BLD QL: SLIGHT — SIGNIFICANT CHANGE UP
KETONES UR-MCNC: ABNORMAL
LEUKOCYTE ESTERASE UR-ACNC: ABNORMAL
LYMPHOCYTES # BLD AUTO: 1 % — LOW (ref 20–55)
MACROCYTES BLD QL: SLIGHT — SIGNIFICANT CHANGE UP
MCHC RBC-ENTMCNC: 30.5 PG — SIGNIFICANT CHANGE UP (ref 27–31)
MCHC RBC-ENTMCNC: 33.8 G/DL — SIGNIFICANT CHANGE UP (ref 32–36)
MCV RBC AUTO: 90.3 FL — SIGNIFICANT CHANGE UP (ref 80–94)
MICROCYTES BLD QL: SLIGHT — SIGNIFICANT CHANGE UP
NEUTROPHILS NFR BLD AUTO: 88 % — HIGH (ref 37–73)
NEUTS BAND # BLD: 10 % — HIGH (ref 0–8)
NITRITE UR-MCNC: NEGATIVE — SIGNIFICANT CHANGE UP
OVALOCYTES BLD QL SMEAR: SLIGHT — SIGNIFICANT CHANGE UP
PH UR: 6.5 — SIGNIFICANT CHANGE UP (ref 5–8)
PLAT MORPH BLD: NORMAL — SIGNIFICANT CHANGE UP
PLATELET # BLD AUTO: 261 K/UL — SIGNIFICANT CHANGE UP (ref 150–400)
POIKILOCYTOSIS BLD QL AUTO: SLIGHT — SIGNIFICANT CHANGE UP
POTASSIUM SERPL-MCNC: 3.7 MMOL/L — SIGNIFICANT CHANGE UP (ref 3.5–5.3)
POTASSIUM SERPL-SCNC: 3.7 MMOL/L — SIGNIFICANT CHANGE UP (ref 3.5–5.3)
PROLACTIN SERPL-MCNC: 16 NG/ML — SIGNIFICANT CHANGE UP (ref 4.1–18.4)
PROT UR-MCNC: 15 MG/DL
RBC # BLD: 3.8 M/UL — LOW (ref 4.6–6.2)
RBC # FLD: 12.2 % — SIGNIFICANT CHANGE UP (ref 11–15.6)
RBC BLD AUTO: ABNORMAL
RBC CASTS # UR COMP ASSIST: SIGNIFICANT CHANGE UP /HPF (ref 0–4)
SODIUM SERPL-SCNC: 136 MMOL/L — SIGNIFICANT CHANGE UP (ref 135–145)
SP GR SPEC: 1.01 — SIGNIFICANT CHANGE UP (ref 1.01–1.02)
TYPE + AB SCN PNL BLD: SIGNIFICANT CHANGE UP
UROBILINOGEN FLD QL: 4 MG/DL
VARIANT LYMPHS # BLD: 1 % — SIGNIFICANT CHANGE UP (ref 0–6)
WBC # BLD: 11 K/UL — HIGH (ref 4.8–10.8)
WBC # FLD AUTO: 11 K/UL — HIGH (ref 4.8–10.8)
WBC UR QL: SIGNIFICANT CHANGE UP

## 2018-03-14 PROCEDURE — 12345: CPT | Mod: NC

## 2018-03-14 PROCEDURE — 70450 CT HEAD/BRAIN W/O DYE: CPT | Mod: 26

## 2018-03-14 PROCEDURE — 99223 1ST HOSP IP/OBS HIGH 75: CPT

## 2018-03-14 RX ORDER — CEFTRIAXONE 500 MG/1
1 INJECTION, POWDER, FOR SOLUTION INTRAMUSCULAR; INTRAVENOUS EVERY 24 HOURS
Qty: 0 | Refills: 0 | Status: DISCONTINUED | OUTPATIENT
Start: 2018-03-14 | End: 2018-03-14

## 2018-03-14 RX ORDER — SODIUM CHLORIDE 9 MG/ML
500 INJECTION INTRAMUSCULAR; INTRAVENOUS; SUBCUTANEOUS ONCE
Qty: 0 | Refills: 0 | Status: COMPLETED | OUTPATIENT
Start: 2018-03-14 | End: 2018-03-14

## 2018-03-14 RX ORDER — PIPERACILLIN AND TAZOBACTAM 4; .5 G/20ML; G/20ML
3.38 INJECTION, POWDER, LYOPHILIZED, FOR SOLUTION INTRAVENOUS EVERY 8 HOURS
Qty: 0 | Refills: 0 | Status: DISCONTINUED | OUTPATIENT
Start: 2018-03-14 | End: 2018-03-15

## 2018-03-14 RX ORDER — PIPERACILLIN AND TAZOBACTAM 4; .5 G/20ML; G/20ML
3.38 INJECTION, POWDER, LYOPHILIZED, FOR SOLUTION INTRAVENOUS ONCE
Qty: 0 | Refills: 0 | Status: COMPLETED | OUTPATIENT
Start: 2018-03-14 | End: 2018-03-14

## 2018-03-14 RX ORDER — ACETAMINOPHEN 500 MG
650 TABLET ORAL EVERY 6 HOURS
Qty: 0 | Refills: 0 | Status: DISCONTINUED | OUTPATIENT
Start: 2018-03-14 | End: 2018-03-16

## 2018-03-14 RX ORDER — SODIUM CHLORIDE 9 MG/ML
1000 INJECTION INTRAMUSCULAR; INTRAVENOUS; SUBCUTANEOUS ONCE
Qty: 0 | Refills: 0 | Status: COMPLETED | OUTPATIENT
Start: 2018-03-14 | End: 2018-03-14

## 2018-03-14 RX ORDER — SODIUM CHLORIDE 9 MG/ML
1000 INJECTION INTRAMUSCULAR; INTRAVENOUS; SUBCUTANEOUS
Qty: 0 | Refills: 0 | Status: DISCONTINUED | OUTPATIENT
Start: 2018-03-14 | End: 2018-03-16

## 2018-03-14 RX ORDER — CEFTRIAXONE 500 MG/1
1 INJECTION, POWDER, FOR SOLUTION INTRAMUSCULAR; INTRAVENOUS ONCE
Qty: 0 | Refills: 0 | Status: DISCONTINUED | OUTPATIENT
Start: 2018-03-14 | End: 2018-03-14

## 2018-03-14 RX ORDER — IBUPROFEN 200 MG
400 TABLET ORAL ONCE
Qty: 0 | Refills: 0 | Status: COMPLETED | OUTPATIENT
Start: 2018-03-14 | End: 2018-03-14

## 2018-03-14 RX ORDER — ONDANSETRON 8 MG/1
4 TABLET, FILM COATED ORAL EVERY 6 HOURS
Qty: 0 | Refills: 0 | Status: DISCONTINUED | OUTPATIENT
Start: 2018-03-14 | End: 2018-03-16

## 2018-03-14 RX ADMIN — PIPERACILLIN AND TAZOBACTAM 200 GRAM(S): 4; .5 INJECTION, POWDER, LYOPHILIZED, FOR SOLUTION INTRAVENOUS at 06:50

## 2018-03-14 RX ADMIN — SODIUM CHLORIDE 500 MILLILITER(S): 9 INJECTION INTRAMUSCULAR; INTRAVENOUS; SUBCUTANEOUS at 02:55

## 2018-03-14 RX ADMIN — SODIUM CHLORIDE 2000 MILLILITER(S): 9 INJECTION INTRAMUSCULAR; INTRAVENOUS; SUBCUTANEOUS at 03:50

## 2018-03-14 RX ADMIN — Medication 400 MILLIGRAM(S): at 06:49

## 2018-03-14 RX ADMIN — Medication 650 MILLIGRAM(S): at 21:17

## 2018-03-14 RX ADMIN — Medication 650 MILLIGRAM(S): at 05:35

## 2018-03-14 RX ADMIN — PIPERACILLIN AND TAZOBACTAM 25 GRAM(S): 4; .5 INJECTION, POWDER, LYOPHILIZED, FOR SOLUTION INTRAVENOUS at 21:17

## 2018-03-14 RX ADMIN — PIPERACILLIN AND TAZOBACTAM 25 GRAM(S): 4; .5 INJECTION, POWDER, LYOPHILIZED, FOR SOLUTION INTRAVENOUS at 13:20

## 2018-03-14 RX ADMIN — SODIUM CHLORIDE 150 MILLILITER(S): 9 INJECTION INTRAMUSCULAR; INTRAVENOUS; SUBCUTANEOUS at 06:50

## 2018-03-14 RX ADMIN — Medication 400 MILLIGRAM(S): at 09:12

## 2018-03-14 RX ADMIN — SODIUM CHLORIDE 1000 MILLILITER(S): 9 INJECTION INTRAMUSCULAR; INTRAVENOUS; SUBCUTANEOUS at 05:05

## 2018-03-14 RX ADMIN — SODIUM CHLORIDE 150 MILLILITER(S): 9 INJECTION INTRAMUSCULAR; INTRAVENOUS; SUBCUTANEOUS at 13:20

## 2018-03-14 NOTE — H&P ADULT - ASSESSMENT
[age] yo male w/ PMH of Cholelithiasis presented with fever of 105F, cough, and palpitations. Patient found to have prostate lesion suspicious for abscess. Urology on board. 26 yo homosexual male w/ PMH of Cholelithiasis presented with fever of 105F, cough, and palpitations. Patient presented with sepsis picture of fever, tachycardia, low blood pressure and now elevated WBC. Found to have prostate fluid collection suspicious for abscess. Urology on board. Also has multiple gallstones on US which were diagnosed 2 weeks prior. 26 yo homosexual male w/ PMH of Cholelithiasis presented with fever of 105F, cough, and palpitations. Patient presented with sepsis picture of fever, tachycardia, low blood pressure and now elevated WBC. Found to have prostate fluid collection suspicious for abscess. Urology on board. Also has multiple gallstones on US ,  which were diagnosed 2 weeks prior, evaluated by surgical team.

## 2018-03-14 NOTE — ED ADULT NURSE REASSESSMENT NOTE - INTEGUMENTARY WDL
Color consistent with ethnicity/race, warm, dry, resilient. Pt denies any cuts, bruises or open areas of skin.

## 2018-03-14 NOTE — PROGRESS NOTE ADULT - SUBJECTIVE AND OBJECTIVE BOX
Feels better  voiding well  No fever last 2 shifts  ID note appreciated  cultures pending                        11.6   11.0  )-----------( 261      ( 14 Mar 2018 08:16 )             34.3   03-14    136  |  101  |  8.0  ----------------------------<  79  3.7   |  21.0<L>  |  0.66    Ca    7.7<L>      14 Mar 2018 08:16    TPro  6.4<L>  /  Alb  2.9<L>  /  TBili  1.0  /  DBili  x   /  AST  21  /  ALT  31  /  AlkPhos  78  03-13  On Rocephin

## 2018-03-14 NOTE — H&P ADULT - GENITOURINARY
Alert-The patient is alert, awake and responds to voice. The patient is oriented to time, place, and person. The triage nurse is able to obtain subjective information.
details…

## 2018-03-14 NOTE — H&P ADULT - NSHPLABSRESULTS_GEN_ALL_CORE
Labs  CBC Full  -  ( 13 Mar 2018 20:49 )  WBC Count : 15.2 K/uL  Hemoglobin : 12.0 g/dL  Hematocrit : 34.9 %  Platelet Count - Automated : 272 K/uL  Mean Cell Volume : 89.3 fl  Mean Cell Hemoglobin : 30.7 pg  Mean Cell Hemoglobin Concentration : 34.4 g/dL  Auto Neutrophil # : 14.4 K/uL  Auto Lymphocyte # : 0.2 K/uL  Auto Monocyte # : 0.4 K/uL  Auto Eosinophil # : 0.0 K/uL  Auto Basophil # : 0.0 K/uL  Auto Neutrophil % : 83.0 %  Auto Lymphocyte % : 4.0 %  Auto Monocyte % : 4.0 %  Auto Eosinophil % : 0.0 %  Auto Basophil % : 0.0 %    136  |  100  |  9.0  ----------------------------<  99  3.8   |  20.0<L>  |  0.77    Ca    8.2<L>      13 Mar 2018 20:49    TPro  6.4<L>  /  Alb  2.9<L>  /  TBili  1.0  /  DBili  x   /  AST  21  /  ALT  31  /  AlkPhos  78  03-13    Urinalysis Basic - ( 13 Mar 2018 14:41 )    Color: Yellow / Appearance: Clear / S.010 / pH: x  Gluc: x / Ketone: Moderate  / Bili: Negative / Urobili: 4 mg/dL   Blood: x / Protein: 30 mg/dL / Nitrite: Negative   Leuk Esterase: Trace / RBC: x / WBC 0-2   Sq Epi: x / Non Sq Epi: Occasional / Bacteria: x    PT/INR - ( 13 Mar 2018 20:49 )   PT: 16.3 sec;   INR: 1.47 ratio       PTT - ( 13 Mar 2018 20:49 )  PTT:32.2 sec    Lactate: 1.4 -> 1.0    RVP: negative  Group A Strep: negative    Imaging  < from: CT Abdomen and Pelvis w/ IV Cont (18 @ 18:24) >  FINDINGS:  The liver is unremarkable. There is diffuse thickening of the gallbladder   wall which is contracted. Cholelithiasis is noted. No definite evidence   of pericholecystic inflammation.    The spleen is not enlarged and demonstrates no focal abnormality. The   pancreaticcontour is unremarkable without evidence of mass, inflammation   or ductal dilatation. The adrenal glands demonstrate normal size and   contour.    The kidneys reveal a normal enhanced morphology. No evidence of   retroperitoneal or pelvic lymphadenopathy.    There is a fluid collection within the prostate gland measuring 3.2 x 2.4   cm. The prostate gland is enlarged. The seminal vesicles are   unremarkable. The bladder demonstrates no abnormality. Inflammatory   changes are noted surrounding the prostate gland.    The colon is unremarkable. The appendix is visualized and is normal.    No significant osseous abnormality..    IMPRESSION:    Fluid collection within the prostate gland with surrounding inflammatory   changes. Findings are suspicious for a prostatic abscess.    Cholelithiasis and thickening of the gallbladder wall without   pericholecystic inflammation or fluid.    < from: CT Chest No Cont (18 @ 18:24) >    IMPRESSION:     Unremarkable CT scan of the chest.    < from: US Gallbladder (18 @ 22:42) >    FINDINGS:    Multiple gallstones are noted within a partially contracted gallbladder.   Borderline wall thickening measuring 3 mm. No pericholecystic fluid is   seen. Sonographic Marrero sign was negative.    The CBD measures 2-3 mm.    IMPRESSION:    Multiple gallstones with borderline wall thickening although the   gallbladder is partially contracted. Negative sonographic Marrero sign.   Consider HIDA scan.

## 2018-03-14 NOTE — PROGRESS NOTE ADULT - SUBJECTIVE AND OBJECTIVE BOX
INTERVAL HPI/OVERNIGHT EVENTS: Pt started on IV Abx.  Seen by urology consult which agrees source is likely prostate.  Pt had fever  but has improved morte recently.  WBC mildly improving.  Pain the same.  Denies pain on defecation.  Denies RUQ pain, NV.     SUBJECTIVE:  Flatus: [ ] YES [ ]   Bowel Movement: [ ] [ ] NO  Pain (0-10):            Pain Control Adequate: [ ] [ ] NO  Nausea: [ ] [ ] NO            Vomiting: [ ] [ ] NO  Diarrhea: [ ] [ ] NO         Constipation: [ ] [ ] NO     Chest Pain: [ ] [ ] NO    SOB:  [ ] [ ] NO    MEDICATIONS  (STANDING):  piperacillin/tazobactam IVPB. 3.375 Gram(s) IV Intermittent every 8 hours  sodium chloride 0.9%. 1000 milliLiter(s) (150 mL/Hr) IV Continuous <Continuous>    MEDICATIONS  (PRN):  acetaminophen   Tablet 650 milliGRAM(s) Oral every 6 hours PRN For Temp greater than or equal to 38 C (100.4 F)  ondansetron Injectable 4 milliGRAM(s) IV Push every 6 hours PRN Nausea and/or Vomiting      Vital Signs Last 24 Hrs  T(C): 36.5 (14 Mar 2018 11:40), Max: 40.6 (13 Mar 2018 16:57)  T(F): 97.7 (14 Mar 2018 11:40), Max: 105.1 (13 Mar 2018 16:57)  HR: 97 (14 Mar 2018 11:40) (79 - 111)  BP: 85/47 (14 Mar 2018 11:40) (79/46 - 106/67)  BP(mean): --  RR: 20 (14 Mar 2018 11:40) (13 - 22)  SpO2: 100% (14 Mar 2018 11:40) (95% - 100%)    PHYSICAL EXAM:     Gen:NAD, Well appearing, No cyanosis, Pallor.    Eyes: PERRL ~ 3mm, EOMI,     Neurological: A&Ox3, GCS 15, No focal deficit.     ENMT: Clear canals, clear throat.      Neck: Supple. NT AT, FROM no pain.  No JVD. No meningeal signs    Pulmonary: NAD, CTA, = BL .      Cardiovascular: RRR, S1, S2, No Murmurs, rubs or gallops noted.    Gastrointestinal:ND, Soft, NT.    Extremities: NT, AT, no edema, erythema or palpable cord noted.  FROM, = 2+ pulses throughout.        I&O's Detail    14 Mar 2018 07:01  -  14 Mar 2018 14:13  --------------------------------------------------------  IN:    sodium chloride 0.9%.: 450 mL  Total IN: 450 mL    OUT:  Total OUT: 0 mL    Total NET: 450 mL          LABS:                        11.6   11.0  )-----------( 261      ( 14 Mar 2018 08:16 )             34.3     03-14    136  |  101  |  8.0  ----------------------------<  79  3.7   |  21.0<L>  |  0.66    Ca    7.7<L>      14 Mar 2018 08:16    TPro  6.4<L>  /  Alb  2.9<L>  /  TBili  1.0  /  DBili  x   /  AST  21  /  ALT  31  /  AlkPhos  78  03-13    PT/INR - ( 13 Mar 2018 20:49 )   PT: 16.3 sec;   INR: 1.47 ratio         PTT - ( 13 Mar 2018 20:49 )  PTT:32.2 sec  Urinalysis Basic - ( 14 Mar 2018 10:57 )    Color: Yellow / Appearance: Clear / S.010 / pH: x  Gluc: x / Ketone: Large  / Bili: Negative / Urobili: 4 mg/dL   Blood: x / Protein: 15 mg/dL / Nitrite: Negative   Leuk Esterase: Moderate / RBC: 0-2 /HPF / WBC 3-5   Sq Epi: x / Non Sq Epi: Occasional / Bacteria: Occasional        RADIOLOGY & ADDITIONAL STUDIES:

## 2018-03-14 NOTE — PROGRESS NOTE ADULT - ASSESSMENT
26 yo M with sepsis.  Likely from prostate abscess.  NOT related to Cholecystis or another active gall bladder infection.      1. Continue abx  2. FU  Plan for additional source control  3. Out patient cholecystectomy for cholelithiasis with our clinic or another gen surgical office.    4. We will sign off.  Re-consult our service as needed. [phone]

## 2018-03-14 NOTE — H&P ADULT - PROBLEM SELECTOR PLAN 4
Encourage ambulation  VCD boots US Abd: multiple gallstones with bladder wall thickening  No RUQ pain, neg [place] sign  surgery consult and recommendations appreciated. no surgical intervention at this point.

## 2018-03-14 NOTE — CONSULT NOTE ADULT - SUBJECTIVE AND OBJECTIVE BOX
HPI:   27YM presenting with palpitations for 3 days. Patient reports that he is having some chills, fevers and intermittent burning on micturition. Patient reports that he had severe GERD two weeks ago and was seen in OSH and was diagnosed with gall stones at that time, but no pain since then. He denies any nausea or vomiting, chest pain or SOB.    In ED the patient spiked fevers multiple times , highest was 105, WBC went up from 8  to 15 with a neutrophilic shift.   CT scan was performed that show a thick walled collection in the prostate about 3cm in diameter. The gall bladder wall was thickened with gall stones and no PCF.  Urology were initially consulted and there was a low suspicion that it was a prostate abscess.    US was performed and it showed borderline thickened GBW , gall stones, no PCF , - radiological murphys sign  LFT , lipase and lactate were normal.    I saw the patient afterwards and the patient informed me that he is having some burning with micturition, on MYCHAL there was some tenderness on palpation an enlarged boggy prostate.  RUQ was not tender at all with no zaragoza's sign.       PAST MEDICAL & SURGICAL HISTORY:  Gall stones  No significant past surgical history      REVIEW OF SYSTEMS      General: fever, chills,	    Skin/Breast: denies  	  Ophthalmologic: denies  	  ENMT:	denies    Respiratory and Thorax: denies  	  Cardiovascular:	denies    Gastrointestinal:	denies    Genitourinary:	burning micturition    Musculoskeletal:	 denies    Neurological:	denies    Psychiatric:	denies    Hematology/Lymphatics: denies 	    Endocrine:	denies     Allergic/Immunologic: denies	        Allergies    No Known Allergies            SOCIAL HISTORY: - t/e/d    FAMILY HISTORY:  No pertinent family history in first degree relatives      Vital Signs Last 24 Hrs  T(C): 36.7 (13 Mar 2018 22:57), Max: 40.6 (13 Mar 2018 16:57)  T(F): 98 (13 Mar 2018 22:57), Max: 105.1 (13 Mar 2018 16:57)  HR: 84 (13 Mar 2018 22:57) (84 - 121)  BP: 90/52 (13 Mar 2018 22:57) (87/53 - 106/67)  BP(mean): --  RR: 16 (13 Mar 2018 22:57) (16 - 22)  SpO2: 100% (13 Mar 2018 22:57) (100% - 100%)    PHYSICAL EXAM:      Constitutional: fever    Eyes: EOMI PERRL, non icteric    ENMT: normal    Neck: supple    Respiratory: CTAB    Cardiovascular: sinus tachy + S1 S2    Gastrointestinal: soft NT ND negative zaragoza's , no guarding no rigidity.    Genitourinary: MYCHAL , enlarged boggy prostate with tenderness on palpation. no rectal mass , no bleeding per rectum appreciated.     Rectal: as mentioned above.    Extremities: - c/c/e     Vascular: +2 peripheral pulse    Neurological: AAOx3    Skin: dry intact    Lymph Nodes: no lymphadenopathy    Musculoskeletal: normal    Psychiatric: wnl.        LABS:                        12.0   15.2  )-----------( 272      ( 13 Mar 2018 20:49 )             34.9     03-    136  |  100  |  9.0  ----------------------------<  99  3.8   |  20.0<L>  |  0.77    Ca    8.2<L>      13 Mar 2018 20:49    TPro  6.4<L>  /  Alb  2.9<L>  /  TBili  1.0  /  DBili  x   /  AST  21  /  ALT  31  /  AlkPhos  78  03-13    PT/INR - ( 13 Mar 2018 20:49 )   PT: 16.3 sec;   INR: 1.47 ratio         PTT - ( 13 Mar 2018 20:49 )  PTT:32.2 sec  Urinalysis Basic - ( 13 Mar 2018 14:41 )    Color: Yellow / Appearance: Clear / S.010 / pH: x  Gluc: x / Ketone: Moderate  / Bili: Negative / Urobili: 4 mg/dL   Blood: x / Protein: 30 mg/dL / Nitrite: Negative   Leuk Esterase: Trace / RBC: x / WBC 0-2   Sq Epi: x / Non Sq Epi: Occasional / Bacteria: x        RADIOLOGY & ADDITIONAL STUDIES:

## 2018-03-14 NOTE — H&P ADULT - NEGATIVE CARDIOVASCULAR SYMPTOMS
no peripheral edema/no chest pain/no paroxysmal nocturnal dyspnea/no palpitations/no dyspnea on exertion/no orthopnea

## 2018-03-14 NOTE — ED ADULT NURSE REASSESSMENT NOTE - NEURO WDL
Asleep but easily arouses to voice and oriented to person, place and time, memory intact, behavior appropriate to situation, PERRL.

## 2018-03-14 NOTE — H&P ADULT - NSHPPHYSICALEXAM_GEN_ALL_CORE
Vitals  T(C): 37.7 (03-14-18 @ 02:18), Max: 40.6 (03-13-18 @ 16:57)  HR: 111 (03-14-18 @ 02:18) (84 - 121)  BP: 95/53 (03-14-18 @ 02:18) (87/53 - 106/67)  RR: 18 (03-14-18 @ 02:18) (16 - 22)  SpO2: 98% (03-14-18 @ 02:18) (98% - 100%)    Physical Exam:   GENERAL: NAD, well-groomed, well-developed  HEAD:  Atraumatic, Normocephalic  EYES: EOMI, PERRLA, conjunctiva and sclera clear  ENMT: No tonsillar erythema, exudates, or enlargement; Moist mucous membranes, Good dentition, No lesions  NECK: Supple, No JVD, Normal thyroid  NERVOUS SYSTEM:  Alert & Oriented X3, Good concentration; Motor Strength 5/5 B/L upper and lower extremities;   CHEST/LUNG: Clear to auscultation bilaterally; No rales, rhonchi, wheezing, or rubs  HEART: Tachycardic; No murmurs, rubs, or gallops  ABDOMEN: Negative [place] Sign, Soft, Round, Nontender, Nondistended; Bowel sounds present  BACK: Mild right CVA tenderness  EXTREMITIES:  2+ Peripheral Pulses, No clubbing, cyanosis, or edema  : Patient denied  exam  SKIN: No rashes or lesions Vitals  T(C): 37.7 (03-14-18 @ 02:18), Max: 40.6 (03-13-18 @ 16:57)  HR: 111 (03-14-18 @ 02:18) (84 - 121)  BP: 95/53 (03-14-18 @ 02:18) (87/53 - 106/67)  RR: 18 (03-14-18 @ 02:18) (16 - 22)  SpO2: 98% (03-14-18 @ 02:18) (98% - 100%)    Physical Exam:   GENERAL: NAD, well-groomed, well-developed  HEAD:  Atraumatic, Normocephalic  EYES: EOMI, PERRLA, conjunctiva and sclera clear  ENMT: No tonsillar erythema, exudates, or enlargement; Moist mucous membranes, Good dentition, No lesions  NECK: Supple, No JVD, Normal thyroid  NERVOUS SYSTEM:  Alert & Oriented X3, Good concentration; Motor Strength 5/5 B/L upper and lower extremities;   CHEST/LUNG: Clear to auscultation bilaterally; No rales, rhonchi, wheezing, or rubs  HEART: Tachycardic; No murmurs, rubs, or gallops  ABDOMEN: Negative [place] Sign, Soft, Round, Nontender; Bowel sounds present  BACK: Mild right CVA tenderness  EXTREMITIES:  2+ Peripheral Pulses, No clubbing, cyanosis, or edema  : Patient denied  exam  SKIN: No rashes or lesions Vitals  T(C): 37.7 (03-14-18 @ 02:18), Max: 40.6 (03-13-18 @ 16:57)  HR: 111 (03-14-18 @ 02:18) (84 - 121)  BP: 95/53 (03-14-18 @ 02:18) (87/53 - 106/67)  RR: 18 (03-14-18 @ 02:18) (16 - 22)  SpO2: 98% (03-14-18 @ 02:18) (98% - 100%)    Physical Exam:   GENERAL: NAD, well-groomed, well-developed  HEAD:  Atraumatic, Normocephalic  EYES: EOMI, PERRLA, conjunctiva and sclera clear  ENMT: No tonsillar erythema, exudates, or enlargement; mucous membranes somewhat dry. Good dentition, No lesions  NECK: Supple, No JVD, Normal thyroid  NERVOUS SYSTEM:  Alert & Oriented X3, Good concentration; Motor Strength 5/5 B/L upper and lower extremities;   CHEST/LUNG: Clear to auscultation bilaterally; No rales, rhonchi, wheezing, or rubs  HEART: Tachycardic; S1, S2.No murmurs.   ABDOMEN: Negative [place] Sign, Soft, obese, Nontender; Bowel sounds present  EXTREMITIES:  2+ Peripheral Pulses, No clubbing, cyanosis, or edema  FROM of all joints and ext.   : Patient denied  exam  SKIN: warm and dry, No rashes or lesions.  Psychiatric : normal.

## 2018-03-14 NOTE — CONSULT NOTE ADULT - ASSESSMENT
27YM with fever, leucocytosis , prostatic collection most likely an abscess, GBW thickening most likely from previous episodes of biliary colics.    - Currently on clinical and radiological examination the patient does not seen to have acute cholecystitis, and the gall bladder is unlikely to be the cause of his high fever and leucocytosis given that he is not even tender in RUQ and on US there are no signs of acute inflammation.   - Most likely those sx are due to his prostate collection that is most likely an abscess. I relayed that to ED attending and urology PA.   - Recommend patient to be placed on IV ABx with high prostatic penetrance and to be followed up by urology for possible need of transrectal drainage.   - We will continue to follow patient while inpatient to monitor his abdominal exam.   Patient discussed with attending who agrees on above plan.

## 2018-03-14 NOTE — ED ADULT NURSE REASSESSMENT NOTE - NS ED NURSE REASSESS COMMENT FT1
recatal temp taken and Dr. Reis made aware patient givne tylenol as ordered patient remains alert and orientates states having dizziness with ambulation at times.  will monitor and chart changes
Dr. Lopez at bedside to reeval.
Dr. Reis at bedside aware of amount of fluids that patient have rec'd. v/s taken and b/p getting better will monitor and chart changes
Dr. Reis made aware of v/s additonal liter of NS started will monitor patient and chart changes
Pt c/o chills, pt afebrile.  Dr. Lopez made aware.  Pt to be dc'd p 3000 cc's fluid.
Pt in no acute distress, vss, report given and pt endorsed to receiving EDMUND Florentino for follow up and continuity of care.
Report received from outgoing RN Jeanne Sweet and assumed care of pt at this time.
patient spoke to by Dr. Neri and will be seen by Surgery team
pt febrile and hypotensive as per flowsheet, assymptomatic, resting comfortably in bed.  Dr. Neri made aware.

## 2018-03-14 NOTE — CONSULT NOTE ADULT - ATTENDING COMMENTS
27M with biliary colic symptoms 2 weeks ago which has resolved and was sent to his primary for follow up evaluation.  For past three days, he reports fevers, malaise, and palpitations prompting him to come to ED.  He reports no right upper quadrant pain though some mild nausea and bloating sensation with PO intake.  Reports burning with urination with decreased output for past day.     PMHX and surgical hx: none  Soc: no smoking, drugs, not sexually active  Meds, all: none  Fam hx: noncontributory  ROS: 10 systems negative except for that stated in HPI    Vitals: Fever of 105, HR 120s, BP systolics of 90s  Constitutional: does not appear in distress  HEENT: PERRL, EOMI, anicteric  CV: tachycardic  RESP: CTAB  GI: soft, nondistended, nontender, no right upper quadrant tenderness with deep palpation, negative Marrero's  Rectal: refused another rectal examination  Musculskeletal: no edema    WBC 15  LFTs normal    CT contracted gallbladder with gallbladder wall thickening, fluid collection in prostate  RUQ US: mild gallbladder wall thickening, stones, no pericholecystic fluid    A/P 27M septic likely from prostate abscess.  Though he refused another rectal examination as he already had prior rectal examinations, he reports tenderness from previous examination.  No RUQ tenderness with no evidence of cholangitis and though has recent history of biliary colic, he does not have symptoms or examination consistent with acute cholecystitis.  Recommend admission, aggressive resuscitation, and IV antibiotics  Recommend sending urine for gram stain and culture  Recommend STI evaluation and HIV testing  Recommend urology consultation  We will do serial abdominal examinations while patient is inpatient but currently low suspicion of gallbladder pathology as cause of sepsis.  Highly recommend aggressive evaluation of prostatic fluid collection associated with inflammatory changes.

## 2018-03-14 NOTE — H&P ADULT - PROBLEM SELECTOR PLAN 3
US Abd: multiple gallstones with bladder wall thickening  No RUQ pain, neg [place] sign  To be managed as outpatient 9/10 headache since Sunday, b/l  Limited relief with Ibuprofen  will get ct head.

## 2018-03-14 NOTE — H&P ADULT - HISTORY OF PRESENT ILLNESS
26 yo male with PMH of cholelithiasis presents to the hospital due to palpitations, nonproductive cough and fever as high as 107F at home. He reported dysuria, burning with urination and 2 weeks of brown colored urine. He denies any abnormal penile discharge or lesions/plaques. He states he has sex with men, was last sexually active a month ago and uses condoms all the time. He states he has been tested for HIV in the past and it has been negative. He is agreeable to be re-tested. He declined testing for chlamydia and gonorrhea. Patient states he also had nausea without vomiting today and H/A since Sunday. The headache is 9/10, on/off, b/l, w/ no light sensitivity, associated lacrimation or rhinorrhea. He denies any nuchal tenderness or trauma. He states he is cold and has shivering due to sweating earlier and that his cough has improved.   Currently he denies any fever, dizziness, CP, SOB, Abd pain, diarrhea, constipation or calf pain. 26 yo male with PMH of cholelithiasis presents to the hospital due to palpitations, nonproductive cough and fever as high as 107F at home. He reported dysuria, burning with urination and 2 weeks of brown colored urine. He denies any abnormal penile discharge or lesions/plaques. He states he has sex with men, was last sexually active a month ago and uses condoms all the time. He states he has been tested for HIV in the past and it has been negative. He is agreeable to be re-tested. He declined testing for chlamydia and gonorrhea. Patient states he also had nausea associated with PO intake but without vomiting and H/A since Sunday. The headache is 9/10, on/off, b/l, w/ no light sensitivity, associated lacrimation or rhinorrhea. He denies any nuchal tenderness or trauma. He states he is cold and has shivering due to sweating earlier and that his cough has improved.   Currently he denies any fever, dizziness, CP, SOB, Abd pain, diarrhea, constipation or calf pain. 26 yo male with PMH of cholelithiasis presents to the hospital due to palpitations, nonproductive cough and fever at home as high as 107 as per pt . He reported dysuria, burning with urination and 2 weeks of brown colored urine. He denies any abnormal penile discharge or lesions/plaques. He states he has sex with men, was last sexually active a month ago and uses condoms all the time. He states he has been tested for HIV in the past and it has been negative. He is agreeable to be re-tested. He declined testing for chlamydia and gonorrhea. Patient states he also had nausea associated with PO intake but without vomiting and H/A since Sunday. The headache is 9/10, on/off, b/l, w/ no light sensitivity, associated lacrimation or rhinorrhea. He denies any neck pain. He states he is cold and has shivering due to sweating earlier and that his cough has improved.   Currently he denies any fever, dizziness, CP, SOB, Abd pain, diarrhea, constipation or calf pain.

## 2018-03-14 NOTE — CHART NOTE - NSCHARTNOTEFT_GEN_A_CORE
Pt seen and examined. Clinically feels a lot better today. Afebrile since initiation of abx. Dysuria improved. No RUQ pain. Discussed with urology, since pt is clinically improving,  will hold plans for abscess drainage. Possible repeat imaging in am. NPO cancelled & diet ordered. C/w IV Zosyn per ID. NO signs of acute cholecystitis        PHYSICAL EXAM-  GENERAL: NAD, well-groomed, well-developed  HEAD:  Atraumatic, Normocephalic  EYES: EOMI, PERRLA, conjunctiva and sclera clear  NECK: Supple, No JVD  NERVOUS SYSTEM:  Alert & Oriented X3, Motor Strength 5/5 B/L upper and lower extremities; DTRs 2+ intact and symmetric  CHEST/LUNG: Clear to percussion bilaterally; No rales, rhonchi, wheezing, or rubs  HEART: Regular rate and rhythm; No murmurs, rubs, or gallops  ABDOMEN: Soft, Nontender, Marrero's negative, Nondistended; Bowel sounds present  : refusing  exam  EXTREMITIES:  2+ Peripheral Pulses, No clubbing, cyanosis, or edema  SKIN: No rashes or lesions      Discussed with pt & father at length

## 2018-03-14 NOTE — H&P ADULT - PROBLEM SELECTOR PLAN 2
9/10 headache since Sunday, b/l  Limited relief with Ibuprofen 9/10 headache since Sunday, b/l  Limited relief with Ibuprofen  Concern for intracranial pathology  Pending Head CT Likely source of sepsis, prostate abscess. r/o bacteremia.    Initial fever of 105F in ED, tachycardia, low BP  Uptrending white count: 15.2  Lactate normal  UA- trace leuk est, pos protein; Dysuria  CT Abd- 3.2x2.4cm fluid collection in prostate concerning for abscess  IV Fluids Bolus,  and then 150cc/hr  IV Rocephin 1g qd  Tylenol PRN  Urology consult and follow up.   ID Consult

## 2018-03-14 NOTE — CONSULT NOTE ADULT - SUBJECTIVE AND OBJECTIVE BOX
[facility] [place] JIMBO CAMPBELL MD FACP   TRISHA MARISCAL MD  Diplomates American Board of Internal Medicine and Infecctious Diseases  [id number]  [id number] KATHY [id number]      HPI:  28 yo male with PMH of cholelithiasis presents to the hospital due to palpitations, nonproductive cough and fever at home as high as 107 as per pt . He reported dysuria, burning with urination and 2 weeks of brown colored urine. He denies any abnormal penile discharge or lesions/plaques. He states he has sex with men, was last sexually active a month ago and uses condoms all the time. He states he has been tested for HIV in the past and it has been negative.  CT SCAN WITH PROSTATIC ABSCESS  U/S WITH GALLSTONES  ASKED TO EVALUATE FROM ID STANDPOINT     PAST MEDICAL & SURGICAL HISTORY:  Gall stones  History of strabismus surgery      ANTIBIOTICS  cefTRIAXone   IVPB 1 Gram(s) IV Intermittent every 24 hours      Allergies    No Known Allergies    Intolerances        SOCIAL HISTORY:    FAMILY HISTORY:  No pertinent family history in first degree relatives      Vital Signs Last 24 Hrs  T(C): 37.1 (14 Mar 2018 08:52), Max: 40.6 (13 Mar 2018 16:57)  T(F): 98.7 (14 Mar 2018 08:52), Max: 105.1 (13 Mar 2018 16:57)  HR: 94 (14 Mar 2018 07:34) (84 - 121)  BP: 90/54 (14 Mar 2018 07:34) (79/46 - 106/67)  BP(mean): --  RR: 14 (14 Mar 2018 07:34) (14 - 22)  SpO2: 95% (14 Mar 2018 07:34) (95% - 100%)  Drug Dosing Weight  Height (cm): 165.1 (13 Mar 2018 11:09)  Weight (kg): 111.1 (13 Mar 2018 11:09)  BMI (kg/m2): 40.8 (13 Mar 2018 11:09)  BSA (m2): 2.16 (13 Mar 2018 11:09)      REVIEW OF SYSTEMS:    CONSTITUTIONAL:  As per HPI.    HEENT:  Eyes:  No diplopia or blurred vision. ENT:  No earache, sore throat or runny nose.    CARDIOVASCULAR:  No pressure, squeezing, strangling, tightness, heaviness or aching about the chest, neck, axilla or epigastrium.    RESPIRATORY:  No cough, shortness of breath, PND or orthopnea.    GASTROINTESTINAL:  No nausea, vomiting or diarrhea.    GENITOURINARY:  No dysuria, frequency or urgency.    MUSCULOSKELETAL:  As per HPI.    SKIN:  No change in skin, hair or nails.    NEUROLOGIC:  No paresthesias, fasciculations, seizures or weakness.                  PHYSICAL EXAMINATION:    GENERAL: The patient is a well-developed, well-nourished _____in no apparent distress. ___ is alert and oriented x3.    VITAL SIGNS: T(C): 37.1 (18 @ 08:52), Max: 40.6 (18 @ 16:57)  HR: 94 (18 @ 07:34) (84 - 121)  BP: 90/54 (18 @ 07:34) (79/46 - 106/67)  RR: 14 (18 @ 07:34) (14 - 22)  SpO2: 95% (18 @ 07:34) (95% - 100%)  Wt(kg): --    HEENT: Head is normocephalic and atraumatic.  ANICTERIC  NECK: Supple. No carotid bruits.  No lymphadenopathy or thyromegaly.    LUNGS:COARSE BREATH SOUNDS    HEART: Regular rate and rhythm without murmur.    ABDOMEN: Soft, nontender, and nondistended.  Positive bowel sounds.  No hepatosplenomegaly was noted. NO REBOUND NO GUARDING    EXTREMITIES: NO EDEMA NO ERYTHEMA    NEUROLOGIC: NON FOCAL      SKIN: No ulceration or induration present. NO RASH        BLOOD CULTURES       URINE CX          LABS:                          11.6   11.0  )-----------( 261      ( 14 Mar 2018 08:16 )             34.3   03-14    136  |  101  |  8.0  ----------------------------<  79  3.7   |  21.0<L>  |  0.66    Ca    7.7<L>      14 Mar 2018 08:16    TPro  6.4<L>  /  Alb  2.9<L>  /  TBili  1.0  /  DBili  x   /  AST  21  /  ALT  31  /  AlkPhos  78  03-13         PTT - ( 13 Mar 2018 20:49 )  PTT:32.2 sec  Urinalysis Basic - ( 13 Mar 2018 14:41 )    Color: Yellow / Appearance: Clear / S.010 / pH: x  Gluc: x / Ketone: Moderate  / Bili: Negative / Urobili: 4 mg/dL   Blood: x / Protein: 30 mg/dL / Nitrite: Negative   Leuk Esterase: Trace / RBC: x / WBC 0-2   Sq Epi: x / Non Sq Epi: Occasional / Bacteria: x        RADIOLOGY & ADDITIONAL STUDIES:      ASSESSMENT/PLAN    28 yo male with PMH of cholelithiasis presents to the hospital due to palpitations, nonproductive cough and fever at home as high as 107 as per pt . He reported dysuria, burning with urination and 2 weeks of brown colored urine. He denies any abnormal penile discharge or lesions/plaques. He states he has sex with men, was last sexually active a month ago and uses condoms all the time. He states he has been tested for HIV in the past and it has been negative.  CT SCAN WITH PROSTATIC ABSCESS  U/S WITH GALLSTONES   BLOOD CX AND URINE CX NEG  WILL CHANGE TO ZOSYN UNTIL CX ARE BACK  WILL FOLLOW UP                 TRISHA GALAN MD

## 2018-03-14 NOTE — H&P ADULT - PROBLEM SELECTOR PLAN 1
Admit to Hospitalist service under care of Dr Reis  Admit to telemetry bed  Activity- ambulate as tolerated  Diet- NPO  Initial fever of 105F in ED  Uptrending white count  CT Abd-   Urology recs-   ID Consult Admit to Hospitalist service under care of Dr Reis  Admit to telemetry bed  Activity- ambulate as tolerated  Diet- NPO  Initial fever of 105F in ED  Uptrending white count: 15.2  Lactate normal  UA- trace leuk est, pos protein  CT Abd- 3.2x2.4cm fluid collection in prostate concerning for abscess  IV Fluids 3.5L Bolus, 150cc/hr  IV Rocephin 1g qd  Tylenol PRN  Urology recs appreciated  ID Consult Admit to Hospitalist service under care of Dr Reis  Admit to telemetry bed  Activity- ambulate as tolerated  Diet- NPO  Likely source of sepsis  Initial fever of 105F in ED, tachycardia, low BP  Uptrending white count: 15.2  Lactate normal  UA- trace leuk est, pos protein; Dysuria  CT Abd- 3.2x2.4cm fluid collection in prostate concerning for abscess  IV Fluids 3.5L Bolus, 150cc/hr  IV Rocephin 1g qd  Tylenol PRN  Urology recs appreciated- enlarged asymmetric prostate, IV fluids and abx  ID Consult Admit to Hospitalist service under care of Dr Reis  Admit to telemetry bed  Activity- ambulate as tolerated  iv rocephin.

## 2018-03-15 DIAGNOSIS — E83.39 OTHER DISORDERS OF PHOSPHORUS METABOLISM: ICD-10-CM

## 2018-03-15 LAB
ANION GAP SERPL CALC-SCNC: 11 MMOL/L — SIGNIFICANT CHANGE UP (ref 5–17)
ANISOCYTOSIS BLD QL: SLIGHT — SIGNIFICANT CHANGE UP
BUN SERPL-MCNC: 6 MG/DL — LOW (ref 8–20)
CALCIUM SERPL-MCNC: 7.5 MG/DL — LOW (ref 8.6–10.2)
CHLORIDE SERPL-SCNC: 101 MMOL/L — SIGNIFICANT CHANGE UP (ref 98–107)
CO2 SERPL-SCNC: 24 MMOL/L — SIGNIFICANT CHANGE UP (ref 22–29)
CREAT SERPL-MCNC: 0.71 MG/DL — SIGNIFICANT CHANGE UP (ref 0.5–1.3)
CULTURE RESULTS: NO GROWTH — SIGNIFICANT CHANGE UP
CULTURE RESULTS: SIGNIFICANT CHANGE UP
GLUCOSE SERPL-MCNC: 95 MG/DL — SIGNIFICANT CHANGE UP (ref 70–115)
HCT VFR BLD CALC: 36.1 % — LOW (ref 42–52)
HGB BLD-MCNC: 12.2 G/DL — LOW (ref 14–18)
HYPOCHROMIA BLD QL: SLIGHT — SIGNIFICANT CHANGE UP
LYMPHOCYTES # BLD AUTO: 6 % — LOW (ref 20–55)
MACROCYTES BLD QL: SLIGHT — SIGNIFICANT CHANGE UP
MAGNESIUM SERPL-MCNC: 2.1 MG/DL — SIGNIFICANT CHANGE UP (ref 1.6–2.6)
MCHC RBC-ENTMCNC: 30.3 PG — SIGNIFICANT CHANGE UP (ref 27–31)
MCHC RBC-ENTMCNC: 33.8 G/DL — SIGNIFICANT CHANGE UP (ref 32–36)
MCV RBC AUTO: 89.8 FL — SIGNIFICANT CHANGE UP (ref 80–94)
MONOCYTES NFR BLD AUTO: 5 % — SIGNIFICANT CHANGE UP (ref 3–10)
NEUTROPHILS NFR BLD AUTO: 85 % — HIGH (ref 37–73)
NEUTS BAND # BLD: 3 % — SIGNIFICANT CHANGE UP (ref 0–8)
OVALOCYTES BLD QL SMEAR: SLIGHT — SIGNIFICANT CHANGE UP
PHOSPHATE SERPL-MCNC: 1.9 MG/DL — LOW (ref 2.4–4.7)
PLAT MORPH BLD: NORMAL — SIGNIFICANT CHANGE UP
PLATELET # BLD AUTO: 251 K/UL — SIGNIFICANT CHANGE UP (ref 150–400)
POIKILOCYTOSIS BLD QL AUTO: SLIGHT — SIGNIFICANT CHANGE UP
POTASSIUM SERPL-MCNC: 3.6 MMOL/L — SIGNIFICANT CHANGE UP (ref 3.5–5.3)
POTASSIUM SERPL-SCNC: 3.6 MMOL/L — SIGNIFICANT CHANGE UP (ref 3.5–5.3)
RBC # BLD: 4.02 M/UL — LOW (ref 4.6–6.2)
RBC # FLD: 12.2 % — SIGNIFICANT CHANGE UP (ref 11–15.6)
RBC BLD AUTO: ABNORMAL
SODIUM SERPL-SCNC: 136 MMOL/L — SIGNIFICANT CHANGE UP (ref 135–145)
SPECIMEN SOURCE: SIGNIFICANT CHANGE UP
SPECIMEN SOURCE: SIGNIFICANT CHANGE UP
VARIANT LYMPHS # BLD: 1 % — SIGNIFICANT CHANGE UP (ref 0–6)
WBC # BLD: 7.2 K/UL — SIGNIFICANT CHANGE UP (ref 4.8–10.8)
WBC # FLD AUTO: 7.2 K/UL — SIGNIFICANT CHANGE UP (ref 4.8–10.8)

## 2018-03-15 PROCEDURE — 99497 ADVNCD CARE PLAN 30 MIN: CPT

## 2018-03-15 PROCEDURE — 72197 MRI PELVIS W/O & W/DYE: CPT | Mod: 26

## 2018-03-15 PROCEDURE — 99233 SBSQ HOSP IP/OBS HIGH 50: CPT

## 2018-03-15 RX ORDER — PIPERACILLIN AND TAZOBACTAM 4; .5 G/20ML; G/20ML
3.38 INJECTION, POWDER, LYOPHILIZED, FOR SOLUTION INTRAVENOUS EVERY 8 HOURS
Qty: 0 | Refills: 0 | Status: DISCONTINUED | OUTPATIENT
Start: 2018-03-15 | End: 2018-03-16

## 2018-03-15 RX ORDER — SACCHAROMYCES BOULARDII 250 MG
250 POWDER IN PACKET (EA) ORAL
Qty: 0 | Refills: 0 | Status: DISCONTINUED | OUTPATIENT
Start: 2018-03-15 | End: 2018-03-16

## 2018-03-15 RX ADMIN — PIPERACILLIN AND TAZOBACTAM 25 GRAM(S): 4; .5 INJECTION, POWDER, LYOPHILIZED, FOR SOLUTION INTRAVENOUS at 13:44

## 2018-03-15 RX ADMIN — SODIUM CHLORIDE 150 MILLILITER(S): 9 INJECTION INTRAMUSCULAR; INTRAVENOUS; SUBCUTANEOUS at 21:23

## 2018-03-15 RX ADMIN — SODIUM CHLORIDE 150 MILLILITER(S): 9 INJECTION INTRAMUSCULAR; INTRAVENOUS; SUBCUTANEOUS at 05:00

## 2018-03-15 RX ADMIN — PIPERACILLIN AND TAZOBACTAM 25 GRAM(S): 4; .5 INJECTION, POWDER, LYOPHILIZED, FOR SOLUTION INTRAVENOUS at 21:23

## 2018-03-15 RX ADMIN — SODIUM CHLORIDE 150 MILLILITER(S): 9 INJECTION INTRAMUSCULAR; INTRAVENOUS; SUBCUTANEOUS at 07:44

## 2018-03-15 RX ADMIN — Medication 62.5 MILLIMOLE(S): at 13:45

## 2018-03-15 RX ADMIN — Medication 250 MILLIGRAM(S): at 17:17

## 2018-03-15 RX ADMIN — PIPERACILLIN AND TAZOBACTAM 25 GRAM(S): 4; .5 INJECTION, POWDER, LYOPHILIZED, FOR SOLUTION INTRAVENOUS at 05:00

## 2018-03-15 NOTE — PROGRESS NOTE ADULT - PROBLEM SELECTOR PLAN 4
US Abd: multiple gallstones with bladder wall thickening  No RUQ pain, neg [place] sign  Surgery consult and recommendations appreciated. No surgical intervention at this point, F/U o/p for possible elective cholecystectomy CT head negative  Resolved

## 2018-03-15 NOTE — PROGRESS NOTE ADULT - SUBJECTIVE AND OBJECTIVE BOX
Patient is a 27y old Male admitted to [facility] with sepsis secondary to prostate abscess.     Pt seen and examined at bedside. Pt ANO x 3, laying in bed, appears comfortable. As per pt, dysuria improving, voiding well and clinically feels much better today. Pt has been afebrile since initiation of antibiotics. Pt denies n/v, fever, chills, headache, chest pain, palpitations, SOB, abdominal pain, hematuria.      INTERVAL HPI/OVERNIGHT EVENTS: No events overnight     MEDICATIONS  (STANDING):  piperacillin/tazobactam IVPB. 3.375 Gram(s) IV Intermittent every 8 hours  sodium chloride 0.9%. 1000 milliLiter(s) (150 mL/Hr) IV Continuous <Continuous>  sodium phosphate IVPB 15 milliMole(s) IV Intermittent once    MEDICATIONS  (PRN):  acetaminophen   Tablet 650 milliGRAM(s) Oral every 6 hours PRN For Temp greater than or equal to 38 C (100.4 F)  ondansetron Injectable 4 milliGRAM(s) IV Push every 6 hours PRN Nausea and/or Vomiting      Allergies: No Known Allergies    REVIEW OF SYSTEMS:  CONSTITUTIONAL: No fever, weight loss, or fatigue  RESPIRATORY: No cough, wheezing, chills or hemoptysis; No shortness of breath  CARDIOVASCULAR: No chest pain, palpitations, dizziness, or leg swelling  GASTROINTESTINAL: No abdominal or epigastric pain. No nausea, vomiting, or hematemesis; No diarrhea or constipation. No melena or hematochezia.  GENITOURINARY: see HPI  NEUROLOGICAL: No headaches, memory loss, loss of strength, numbness, or tremors    Vital Signs Last 24 Hrs  T(C): 37.8 (15 Mar 2018 04:47), Max: 38.2 (14 Mar 2018 21:12)  T(F): 100.1 (15 Mar 2018 04:47), Max: 100.8 (14 Mar 2018 21:12)  HR: 94 (15 Mar 2018 04:47) (79 - 105)  BP: 94/52 (15 Mar 2018 04:47) (85/47 - 116/66)  BP(mean): --  RR: 18 (15 Mar 2018 04:47) (13 - 20)  SpO2: 96% (14 Mar 2018 21:12) (96% - 100%)    PHYSICAL EXAM:  GENERAL: NAD, well-groomed, well-developed  HEAD:  Atraumatic, Normocephalic  NERVOUS SYSTEM:  Alert & Oriented X3, Good concentration  CHEST/LUNG: Clear to auscultation bilaterally; No rales, rhonchi, wheezing, or rubs  HEART: Regular rate and rhythm; No murmurs, rubs, or gallops  ABDOMEN: Soft, Nontender, Nondistended; Bowel sounds present   EXTREMITIES:  2+ Peripheral Pulses, No clubbing, cyanosis, or edema    LABS:                        12.2   7.2   )-----------( 251      ( 15 Mar 2018 07:10 )             36.1     15 Mar 2018 07:10    136    |  101    |  6.0    ----------------------------<  95     3.6     |  24.0   |  0.71     Ca    7.5        15 Mar 2018 07:10  Phos  1.9       15 Mar 2018 07:10  Mg     2.1       15 Mar 2018 07:10      PT/INR - ( 13 Mar 2018 20:49 )   PT: 16.3 sec;   INR: 1.47 ratio         PTT - ( 13 Mar 2018 20:49 )  PTT:32.2 sec  Urinalysis Basic - ( 14 Mar 2018 10:57 )    Color: Yellow / Appearance: Clear / S.010 / pH: x  Gluc: x / Ketone: Large  / Bili: Negative / Urobili: 4 mg/dL   Blood: x / Protein: 15 mg/dL / Nitrite: Negative   Leuk Esterase: Moderate / RBC: 0-2 /HPF / WBC 3-5   Sq Epi: x / Non Sq Epi: Occasional / Bacteria: Occasional      18 @ 03:02  .Urine Clean Catch (Midstream)  No growth    18 @ 16:34  .Throat Nasopharyngeal Swab    No Beta Strep group A isolated  Culture in progress     18 @ 03:02  .Urine Clean Catch (Midstream)     18 @ 16:34  .Throat Nasopharyngeal Swab      RADIOLOGY & ADDITIONAL TESTS:    CT ABDOMEN AND PELVIS WITH CONTRAST:  IMPRESSION: Fluid collection within the prostate gland with surrounding inflammatory   changes. Findings are suspicious for a prostatic abscess. Cholelithiasis and thickening of the gallbladder wall without   pericholecystic inflammation or fluid.        EXAM:  US GALLBLADDER                        PROCEDURE DATE:  2018    IMPRESSION: Multiple gallstones with borderline wall thickening although the   gallbladder is partially contracted. Negative sonographic Marrero sign.   Consider HIDA scan. Patient is a 27y old Male admitted to [facility] with sepsis secondary to prostate abscess.     Pt seen and examined at bedside. Pt ANO x 3, laying in bed, appears comfortable. As per pt, dysuria improving, voiding well and clinically feels much better today. Pt has been afebrile since initiation of antibiotics. Pt denies n/v, fever, chills, headache, chest pain, palpitations, SOB, abdominal pain, hematuria.      INTERVAL HPI/OVERNIGHT EVENTS: No events overnight     MEDICATIONS  (STANDING):  piperacillin/tazobactam IVPB. 3.375 Gram(s) IV Intermittent every 8 hours  sodium chloride 0.9%. 1000 milliLiter(s) (150 mL/Hr) IV Continuous <Continuous>  sodium phosphate IVPB 15 milliMole(s) IV Intermittent once    MEDICATIONS  (PRN):  acetaminophen   Tablet 650 milliGRAM(s) Oral every 6 hours PRN For Temp greater than or equal to 38 C (100.4 F)  ondansetron Injectable 4 milliGRAM(s) IV Push every 6 hours PRN Nausea and/or Vomiting      Allergies: No Known Allergies    REVIEW OF SYSTEMS:  CONSTITUTIONAL: No fever, weight loss, or fatigue  RESPIRATORY: No cough, wheezing, chills or hemoptysis; No shortness of breath  CARDIOVASCULAR: No chest pain, palpitations, dizziness, or leg swelling  GASTROINTESTINAL: No abdominal or epigastric pain. No nausea, vomiting, or hematemesis; No diarrhea or constipation. No melena or hematochezia.  GENITOURINARY: see HPI  NEUROLOGICAL: No headaches, memory loss, loss of strength, numbness, or tremors    Vital Signs Last 24 Hrs  T(C): 37.8 (15 Mar 2018 04:47), Max: 38.2 (14 Mar 2018 21:12)  T(F): 100.1 (15 Mar 2018 04:47), Max: 100.8 (14 Mar 2018 21:12)  HR: 94 (15 Mar 2018 04:47) (79 - 105)  BP: 94/52 (15 Mar 2018 04:47) (85/47 - 116/66)  BP(mean): --  RR: 18 (15 Mar 2018 04:47) (13 - 20)  SpO2: 96% (14 Mar 2018 21:12) (96% - 100%)    PHYSICAL EXAM:  GENERAL: NAD, well-groomed, well-developed  HEAD:  Atraumatic, Normocephalic  NERVOUS SYSTEM:  Alert & Oriented X3, Good concentration  CHEST/LUNG: Clear to auscultation bilaterally; No rales, rhonchi, wheezing, or rubs  HEART: Regular rate and rhythm; No murmurs, rubs, or gallops  ABDOMEN: Negative marrero sign, Soft, Nontender, Nondistended; Bowel sounds present   EXTREMITIES:  2+ Peripheral Pulses, No clubbing, cyanosis, or edema    LABS:                        12.2   7.2   )-----------( 251      ( 15 Mar 2018 07:10 )             36.1     15 Mar 2018 07:10    136    |  101    |  6.0    ----------------------------<  95     3.6     |  24.0   |  0.71     Ca    7.5        15 Mar 2018 07:10  Phos  1.9       15 Mar 2018 07:10  Mg     2.1       15 Mar 2018 07:10      PT/INR - ( 13 Mar 2018 20:49 )   PT: 16.3 sec;   INR: 1.47 ratio         PTT - ( 13 Mar 2018 20:49 )  PTT:32.2 sec  Urinalysis Basic - ( 14 Mar 2018 10:57 )    Color: Yellow / Appearance: Clear / S.010 / pH: x  Gluc: x / Ketone: Large  / Bili: Negative / Urobili: 4 mg/dL   Blood: x / Protein: 15 mg/dL / Nitrite: Negative   Leuk Esterase: Moderate / RBC: 0-2 /HPF / WBC 3-5   Sq Epi: x / Non Sq Epi: Occasional / Bacteria: Occasional      18 @ 03:02  .Urine Clean Catch (Midstream)  No growth    18 @ 16:34  .Throat Nasopharyngeal Swab    No Beta Strep group A isolated  Culture in progress     18 @ 03:02  .Urine Clean Catch (Midstream)     18 @ 16:34  .Throat Nasopharyngeal Swab      RADIOLOGY & ADDITIONAL TESTS:    CT ABDOMEN AND PELVIS WITH CONTRAST:  IMPRESSION: Fluid collection within the prostate gland with surrounding inflammatory   changes. Findings are suspicious for a prostatic abscess. Cholelithiasis and thickening of the gallbladder wall without   pericholecystic inflammation or fluid.        EXAM:  US GALLBLADDER                        PROCEDURE DATE:  2018    IMPRESSION: Multiple gallstones with borderline wall thickening although the   gallbladder is partially contracted. Negative sonographic Marrero sign.   Consider HIDA scan. Patient is a 27y old Male admitted to [facility] with sepsis secondary to prostate abscess.     Pt seen and examined at bedside. Pt ANO x 3, laying in bed, appears comfortable. As per pt, dysuria improving, voiding well and clinically feels much better today. Low grade temp 100.8 overnight. Pt denies n/v, fever, chills, headache, chest pain, palpitations, SOB, abdominal pain, hematuria.      INTERVAL HPI/OVERNIGHT EVENTS: No events overnight     MEDICATIONS  (STANDING):  piperacillin/tazobactam IVPB. 3.375 Gram(s) IV Intermittent every 8 hours  sodium chloride 0.9%. 1000 milliLiter(s) (150 mL/Hr) IV Continuous <Continuous>  sodium phosphate IVPB 15 milliMole(s) IV Intermittent once    MEDICATIONS  (PRN):  acetaminophen   Tablet 650 milliGRAM(s) Oral every 6 hours PRN For Temp greater than or equal to 38 C (100.4 F)  ondansetron Injectable 4 milliGRAM(s) IV Push every 6 hours PRN Nausea and/or Vomiting      Allergies: No Known Allergies    REVIEW OF SYSTEMS:  CONSTITUTIONAL: No fever, weight loss, or fatigue  RESPIRATORY: No cough, wheezing, chills or hemoptysis; No shortness of breath  CARDIOVASCULAR: No chest pain, palpitations, dizziness, or leg swelling  GASTROINTESTINAL: No abdominal or epigastric pain. No nausea, vomiting, or hematemesis; No diarrhea or constipation. No melena or hematochezia.  GENITOURINARY: see HPI  NEUROLOGICAL: No headaches, memory loss, loss of strength, numbness, or tremors    Vital Signs Last 24 Hrs  T(C): 37.8 (15 Mar 2018 04:47), Max: 38.2 (14 Mar 2018 21:12)  T(F): 100.1 (15 Mar 2018 04:47), Max: 100.8 (14 Mar 2018 21:12)  HR: 94 (15 Mar 2018 04:47) (79 - 105)  BP: 94/52 (15 Mar 2018 04:47) (85/47 - 116/66)  BP(mean): --  RR: 18 (15 Mar 2018 04:47) (13 - 20)  SpO2: 96% (14 Mar 2018 21:12) (96% - 100%)    PHYSICAL EXAM:  GENERAL: NAD, well-groomed, well-developed  HEAD:  Atraumatic, Normocephalic  NERVOUS SYSTEM:  Alert & Oriented X3, Good concentration  CHEST/LUNG: Clear to auscultation bilaterally; No rales, rhonchi, wheezing, or rubs  HEART: Regular rate and rhythm; No murmurs, rubs, or gallops  ABDOMEN: Negative marrero sign, Soft, Nontender, Nondistended; Bowel sounds present   EXTREMITIES:  2+ Peripheral Pulses, No clubbing, cyanosis, or edema    LABS:                        12.2   7.2   )-----------( 251      ( 15 Mar 2018 07:10 )             36.1     15 Mar 2018 07:10    136    |  101    |  6.0    ----------------------------<  95     3.6     |  24.0   |  0.71     Ca    7.5        15 Mar 2018 07:10  Phos  1.9       15 Mar 2018 07:10  Mg     2.1       15 Mar 2018 07:10      PT/INR - ( 13 Mar 2018 20:49 )   PT: 16.3 sec;   INR: 1.47 ratio         PTT - ( 13 Mar 2018 20:49 )  PTT:32.2 sec  Urinalysis Basic - ( 14 Mar 2018 10:57 )    Color: Yellow / Appearance: Clear / S.010 / pH: x  Gluc: x / Ketone: Large  / Bili: Negative / Urobili: 4 mg/dL   Blood: x / Protein: 15 mg/dL / Nitrite: Negative   Leuk Esterase: Moderate / RBC: 0-2 /HPF / WBC 3-5   Sq Epi: x / Non Sq Epi: Occasional / Bacteria: Occasional      18 @ 03:02  .Urine Clean Catch (Midstream)  No growth    18 @ 16:34  .Throat Nasopharyngeal Swab    No Beta Strep group A isolated  Culture in progress     18 @ 03:02  .Urine Clean Catch (Midstream)     18 @ 16:34  .Throat Nasopharyngeal Swab      RADIOLOGY & ADDITIONAL TESTS:    CT ABDOMEN AND PELVIS WITH CONTRAST:  IMPRESSION: Fluid collection within the prostate gland with surrounding inflammatory   changes. Findings are suspicious for a prostatic abscess. Cholelithiasis and thickening of the gallbladder wall without   pericholecystic inflammation or fluid.        EXAM:  US GALLBLADDER                        PROCEDURE DATE:  2018    IMPRESSION: Multiple gallstones with borderline wall thickening although the   gallbladder is partially contracted. Negative sonographic Marrero sign.   Consider HIDA scan.

## 2018-03-15 NOTE — PROGRESS NOTE ADULT - PROBLEM SELECTOR PLAN 1
CT a/p reveiwed  Urology and ID on board  Continue with IV zosyn until cultures result  Pt with clinical improvement   Afebrile and WBC trending down  Follow up Pelvic MRI   Drainage options depending on overall clinical improvement CT a/p reviewed  Urology and ID on board  Continue with IV zosyn until cultures result  Pt with clinical improvement   Afebrile and WBC trending down  Follow up Pelvic MRI   Drainage options depending on overall clinical improvement  Pt refusing Gonorrhea and chlamydia testing   Pt agreeable to HIV testing CT a/p reviewed  Urology and ID on board  Continue with IV zosyn, Ucx -ve  Pt with clinical improvement   Afebrile and WBC trending down  Pelvic MRI reviewed  Dr. Coyle suggested transrectal drainage or transperineul drainage initially, but after speaking to Urologist Dr. Divya Dubois at [place] it was deemed that transurethral approach would be the best one if needed. At this point they both feel IV abx without drainage is the best approach for the pt since he is doing well clinically. If pt's clinical condition worsens then he should be transferred to [place] under the care of Dr. Divya Dubois.   f/u Gonorrhea and chlamydia NAAT  HIV -ve

## 2018-03-15 NOTE — PROGRESS NOTE ADULT - PROBLEM SELECTOR PLAN 2
Resolving   Likely source of sepsis, prostate abscess.  Bacteremia to be rule out, follow up cultures   Currently afebrile with VSS  Downtrending WBC (15,11,7)  Lactate normal  UA- trace leuk est, pos protein; Dysuria  CT Abd- 3.2x2.4cm fluid collection in prostate concerning for abscess  IVF 150cc/hr  IV zosyn   Tylenol PRN  Urology and ID on board, recommendations appreciated and implemented Resolving   Likely source of sepsis, prostate abscess.  Bacteremia to be ruled out, follow up cultures   Currently afebrile with VSS  Downtrending WBC (15,11,7)  Lactate normal  UA- trace leuk est, pos protein; Dysuria  CT Abd- 3.2x2.4cm fluid collection in prostate concerning for abscess  IVF 150cc/hr  IV zosyn   Tylenol PRN  Urology and ID on board, recommendations appreciated and implemented Resolving   Likely source of sepsis, prostate abscess.  Bacteremia to be ruled out, follow up cultures   Currently afebrile with VSS  Downtrending WBC (15,11,7)  Lactate normal  UA- trace leuk est, pos protein; Dysuria  CT Abd- 3.2x2.4cm fluid collection in prostate concerning for abscess  Continue with IVF 150cc/hr and IV zosyn   Tylenol PRN  Urology and ID on board, recommendations appreciated and implemented

## 2018-03-15 NOTE — PROGRESS NOTE ADULT - PROBLEM SELECTOR PLAN 1
Studies reviewed, Likely approach for drainage will be trans-rectally. Would consult GI/interventionalist.

## 2018-03-15 NOTE — PROGRESS NOTE ADULT - PROBLEM SELECTOR PLAN 3
9/10 headache since Sunday, b/l  Limited relief with Ibuprofen  will get ct head. Will replete  Follow up repeat phos level

## 2018-03-15 NOTE — PROGRESS NOTE ADULT - SUBJECTIVE AND OBJECTIVE BOX
Fever curve and wbc improved  comfortable had MRI    EXAM: MR PELVIS WAW IC     PROCEDURE DATE: 03/15/2018         INTERPRETATION: Clinical information:Prostate abscess, further evaluation     Comparison: CT 3/13     Procedure: Coronal, sagittal and axial images were obtained on a 1.5T MRI   unit, using T1-weighted, T2-weighted and dynamic gadolinium-enhanced   technique. 10 ml of 0 was administered. Image quality is satisfactory.     Findings:     The prostate measures 5.2 x 4.3 x 4.8 cm. There is a 3.6 x 2.6 x 3.9 cm   multiloculated rim-enhancing complex fluid collection most compatible with   an abscess.     The bladder shows no evidence of mass or wall thickening.     There is small free pelvic intraperitoneal fluid.     There is no pelvic mass or fluid collection.     Lymph nodes 0.9 cm left external iliac and bilateral 0.8 cm internal iliac   lymph nodes, nonspecific in size criteria.     The kidneys show no evidence of mass or obstruction. There is cholelithiasis.     The visible bony structures show no evidence of tumor.     The visualized soft tissues are normal.     IMPRESSION: Left Intraprostatic abscess. Follow-up to ensure resolution.   Mild pelvic adenopathy as described.                     ARMANDO CURTIS M.D., ATTENDING RADIOLOGIST   This document has been electronically signed. Mar 15 2018 10:56AM           Bookmarks   1   2   3   4   5

## 2018-03-15 NOTE — PROGRESS NOTE ADULT - PROBLEM SELECTOR PLAN 5
Encourage ambulation  VCD boots US Abd: multiple gallstones with bladder wall thickening  No RUQ pain, neg [place] sign  Surgery consult and recommendations appreciated. No surgical intervention at this point, F/U o/p for possible elective cholecystectomy

## 2018-03-15 NOTE — PROGRESS NOTE ADULT - ASSESSMENT
28 yo homosexual male w/ PMH of Cholelithiasis presented to [facility] with sepsis picture of fever(105), tachycardia, low blood pressure and now elevated WBC. Found to have prostate fluid collection suspicious for abscess on CT A/P along with multiple non obstructing gallstones on US, which were diagnosed 2 weeks prior. Urology and ID on board, IV abx started. Surgical team on board with no surgical interventions planned for cholelithiasis.

## 2018-03-15 NOTE — PROGRESS NOTE ADULT - ATTENDING COMMENTS
Patient seen and examined at bedside. Agree with above. Note addended where needed. Plan discussed with pt, rn, med PA, Dr. Coyle, ID, GI Patient seen and examined at bedside. Agree with above. Note addended where needed. I called the transfer center for a possible discharge. Plan discussed with pt, rn, med PA, Dr. Coyle, ID, GI, IR. Dr. Coyle suggested transrectal drainage (by interventional GI) or transperineal drainage initially (by IR- which Dr. Ulloa does not recommend), since transurethral would involve resecting a part of the prostate, but after speaking to Urologist Dr. Divya Dubois at [place] it was deemed that transurethral approach would be the best one if needed. At this point they both feel IV abx without drainage is the best approach for the pt since he is doing well clinically. If pt's clinical condition worsens then he should be transferred to [place] under the care of Dr. Divya Dubois. Disccussed the care plan with pt & DR. Merritt

## 2018-03-16 ENCOUNTER — TRANSCRIPTION ENCOUNTER (OUTPATIENT)
Age: 28
End: 2018-03-16

## 2018-03-16 VITALS
DIASTOLIC BLOOD PRESSURE: 72 MMHG | TEMPERATURE: 98 F | HEART RATE: 96 BPM | RESPIRATION RATE: 16 BRPM | SYSTOLIC BLOOD PRESSURE: 120 MMHG

## 2018-03-16 LAB
ANION GAP SERPL CALC-SCNC: 11 MMOL/L — SIGNIFICANT CHANGE UP (ref 5–17)
BASOPHILS # BLD AUTO: 0 K/UL — SIGNIFICANT CHANGE UP (ref 0–0.2)
BASOPHILS NFR BLD AUTO: 0.4 % — SIGNIFICANT CHANGE UP (ref 0–2)
BUN SERPL-MCNC: 4 MG/DL — LOW (ref 8–20)
CALCIUM SERPL-MCNC: 8 MG/DL — LOW (ref 8.6–10.2)
CHLORIDE SERPL-SCNC: 107 MMOL/L — SIGNIFICANT CHANGE UP (ref 98–107)
CO2 SERPL-SCNC: 23 MMOL/L — SIGNIFICANT CHANGE UP (ref 22–29)
CREAT SERPL-MCNC: 0.63 MG/DL — SIGNIFICANT CHANGE UP (ref 0.5–1.3)
EOSINOPHIL # BLD AUTO: 0.2 K/UL — SIGNIFICANT CHANGE UP (ref 0–0.5)
EOSINOPHIL NFR BLD AUTO: 2.7 % — SIGNIFICANT CHANGE UP (ref 0–5)
GLUCOSE SERPL-MCNC: 105 MG/DL — SIGNIFICANT CHANGE UP (ref 70–115)
HCT VFR BLD CALC: 35.2 % — LOW (ref 42–52)
HGB BLD-MCNC: 11.7 G/DL — LOW (ref 14–18)
LYMPHOCYTES # BLD AUTO: 1.5 K/UL — SIGNIFICANT CHANGE UP (ref 1–4.8)
LYMPHOCYTES # BLD AUTO: 26.3 % — SIGNIFICANT CHANGE UP (ref 20–55)
MAGNESIUM SERPL-MCNC: 2.1 MG/DL — SIGNIFICANT CHANGE UP (ref 1.6–2.6)
MCHC RBC-ENTMCNC: 30.4 PG — SIGNIFICANT CHANGE UP (ref 27–31)
MCHC RBC-ENTMCNC: 33.2 G/DL — SIGNIFICANT CHANGE UP (ref 32–36)
MCV RBC AUTO: 91.4 FL — SIGNIFICANT CHANGE UP (ref 80–94)
MONOCYTES # BLD AUTO: 0.4 K/UL — SIGNIFICANT CHANGE UP (ref 0–0.8)
MONOCYTES NFR BLD AUTO: 6.3 % — SIGNIFICANT CHANGE UP (ref 3–10)
NEUTROPHILS # BLD AUTO: 3.6 K/UL — SIGNIFICANT CHANGE UP (ref 1.8–8)
NEUTROPHILS NFR BLD AUTO: 64.1 % — SIGNIFICANT CHANGE UP (ref 37–73)
PHOSPHATE SERPL-MCNC: 2.4 MG/DL — SIGNIFICANT CHANGE UP (ref 2.4–4.7)
PLATELET # BLD AUTO: 261 K/UL — SIGNIFICANT CHANGE UP (ref 150–400)
POTASSIUM SERPL-MCNC: 3.6 MMOL/L — SIGNIFICANT CHANGE UP (ref 3.5–5.3)
POTASSIUM SERPL-SCNC: 3.6 MMOL/L — SIGNIFICANT CHANGE UP (ref 3.5–5.3)
RBC # BLD: 3.85 M/UL — LOW (ref 4.6–6.2)
RBC # FLD: 12.5 % — SIGNIFICANT CHANGE UP (ref 11–15.6)
SODIUM SERPL-SCNC: 141 MMOL/L — SIGNIFICANT CHANGE UP (ref 135–145)
WBC # BLD: 5.6 K/UL — SIGNIFICANT CHANGE UP (ref 4.8–10.8)
WBC # FLD AUTO: 5.6 K/UL — SIGNIFICANT CHANGE UP (ref 4.8–10.8)

## 2018-03-16 PROCEDURE — 86900 BLOOD TYPING SEROLOGIC ABO: CPT

## 2018-03-16 PROCEDURE — 87880 STREP A ASSAY W/OPTIC: CPT

## 2018-03-16 PROCEDURE — 81001 URINALYSIS AUTO W/SCOPE: CPT

## 2018-03-16 PROCEDURE — 36415 COLL VENOUS BLD VENIPUNCTURE: CPT

## 2018-03-16 PROCEDURE — 82803 BLOOD GASES ANY COMBINATION: CPT

## 2018-03-16 PROCEDURE — 71045 X-RAY EXAM CHEST 1 VIEW: CPT

## 2018-03-16 PROCEDURE — 85027 COMPLETE CBC AUTOMATED: CPT

## 2018-03-16 PROCEDURE — 99284 EMERGENCY DEPT VISIT MOD MDM: CPT | Mod: 25

## 2018-03-16 PROCEDURE — 85610 PROTHROMBIN TIME: CPT

## 2018-03-16 PROCEDURE — 84146 ASSAY OF PROLACTIN: CPT

## 2018-03-16 PROCEDURE — 83690 ASSAY OF LIPASE: CPT

## 2018-03-16 PROCEDURE — 87633 RESP VIRUS 12-25 TARGETS: CPT

## 2018-03-16 PROCEDURE — 87581 M.PNEUMON DNA AMP PROBE: CPT

## 2018-03-16 PROCEDURE — 87086 URINE CULTURE/COLONY COUNT: CPT

## 2018-03-16 PROCEDURE — 83735 ASSAY OF MAGNESIUM: CPT

## 2018-03-16 PROCEDURE — 87486 CHLMYD PNEUM DNA AMP PROBE: CPT

## 2018-03-16 PROCEDURE — 86901 BLOOD TYPING SEROLOGIC RH(D): CPT

## 2018-03-16 PROCEDURE — 83605 ASSAY OF LACTIC ACID: CPT

## 2018-03-16 PROCEDURE — 74177 CT ABD & PELVIS W/CONTRAST: CPT

## 2018-03-16 PROCEDURE — 99239 HOSP IP/OBS DSCHRG MGMT >30: CPT

## 2018-03-16 PROCEDURE — 87081 CULTURE SCREEN ONLY: CPT

## 2018-03-16 PROCEDURE — 87798 DETECT AGENT NOS DNA AMP: CPT

## 2018-03-16 PROCEDURE — 80074 ACUTE HEPATITIS PANEL: CPT

## 2018-03-16 PROCEDURE — 99233 SBSQ HOSP IP/OBS HIGH 50: CPT

## 2018-03-16 PROCEDURE — 85730 THROMBOPLASTIN TIME PARTIAL: CPT

## 2018-03-16 PROCEDURE — 86850 RBC ANTIBODY SCREEN: CPT

## 2018-03-16 PROCEDURE — 86703 HIV-1/HIV-2 1 RESULT ANTBDY: CPT

## 2018-03-16 PROCEDURE — 76705 ECHO EXAM OF ABDOMEN: CPT

## 2018-03-16 PROCEDURE — 80048 BASIC METABOLIC PNL TOTAL CA: CPT

## 2018-03-16 PROCEDURE — 80053 COMPREHEN METABOLIC PANEL: CPT

## 2018-03-16 PROCEDURE — 72197 MRI PELVIS W/O & W/DYE: CPT

## 2018-03-16 PROCEDURE — 71250 CT THORAX DX C-: CPT

## 2018-03-16 PROCEDURE — 84100 ASSAY OF PHOSPHORUS: CPT

## 2018-03-16 PROCEDURE — 96374 THER/PROPH/DIAG INJ IV PUSH: CPT

## 2018-03-16 PROCEDURE — 87040 BLOOD CULTURE FOR BACTERIA: CPT

## 2018-03-16 PROCEDURE — 93005 ELECTROCARDIOGRAM TRACING: CPT

## 2018-03-16 PROCEDURE — 70450 CT HEAD/BRAIN W/O DYE: CPT

## 2018-03-16 RX ORDER — SACCHAROMYCES BOULARDII 250 MG
1 POWDER IN PACKET (EA) ORAL
Qty: 84 | Refills: 0 | OUTPATIENT
Start: 2018-03-16 | End: 2018-04-26

## 2018-03-16 RX ORDER — AZITHROMYCIN 500 MG/1
1000 TABLET, FILM COATED ORAL ONCE
Qty: 0 | Refills: 0 | Status: COMPLETED | OUTPATIENT
Start: 2018-03-16 | End: 2018-03-16

## 2018-03-16 RX ADMIN — AZITHROMYCIN 1000 MILLIGRAM(S): 500 TABLET, FILM COATED ORAL at 11:50

## 2018-03-16 RX ADMIN — SODIUM CHLORIDE 150 MILLILITER(S): 9 INJECTION INTRAMUSCULAR; INTRAVENOUS; SUBCUTANEOUS at 05:21

## 2018-03-16 RX ADMIN — Medication 250 MILLIGRAM(S): at 05:20

## 2018-03-16 RX ADMIN — PIPERACILLIN AND TAZOBACTAM 25 GRAM(S): 4; .5 INJECTION, POWDER, LYOPHILIZED, FOR SOLUTION INTRAVENOUS at 05:20

## 2018-03-16 NOTE — DISCHARGE NOTE ADULT - CARE PROVIDERS DIRECT ADDRESSES
,DirectAddress_Unknown,[email or web address] ,DirectAddress_Unknown,[email or web address],DirectAddress_Unknown

## 2018-03-16 NOTE — PROGRESS NOTE ADULT - SUBJECTIVE AND OBJECTIVE BOX
Patient is a 27y old Male admitted to [facility] with sepsis secondary to prostate abscess.     Pt seen and examined at bedside. Pt ANO x 3, laying in bed, appears comfortable. As per pt, dysuria and urine color improving, voiding well and clinically feels better. No reported temp overnight by RN (last recorded temp 98.1). Pt denies n/v, fever, chills, headache, chest pain, palpitations, SOB, abdominal pain, hematuria.      INTERVAL HPI/OVERNIGHT EVENTS: No events overnight     MEDICATIONS  (STANDING):  piperacillin/tazobactam IVPB. 3.375 Gram(s) IV Intermittent every 8 hours  sodium chloride 0.9%. 1000 milliLiter(s) (150 mL/Hr) IV Continuous <Continuous>  sodium phosphate IVPB 15 milliMole(s) IV Intermittent once    MEDICATIONS  (PRN):  acetaminophen   Tablet 650 milliGRAM(s) Oral every 6 hours PRN For Temp greater than or equal to 38 C (100.4 F)  ondansetron Injectable 4 milliGRAM(s) IV Push every 6 hours PRN Nausea and/or Vomiting    Allergies: No Known Allergies    REVIEW OF SYSTEMS:  CONSTITUTIONAL: No fever, weight loss, or fatigue  RESPIRATORY: No cough, wheezing, chills or hemoptysis; No shortness of breath  CARDIOVASCULAR: No chest pain, palpitations, dizziness, or leg swelling  GASTROINTESTINAL: No abdominal or epigastric pain. No nausea, vomiting, or hematemesis; No diarrhea or constipation. No melena or hematochezia.  GENITOURINARY: see HPI  NEUROLOGICAL: No headaches, memory loss, loss of strength, numbness, or tremors    Vital Signs Last 24 Hrs  T(C): 36.7 (16 Mar 2018 04:30), Max: 36.8 (15 Mar 2018 08:20)  T(F): 98.1 (16 Mar 2018 04:30), Max: 98.2 (15 Mar 2018 08:20)  HR: 75 (16 Mar 2018 04:30) (75 - 97)  BP: 95/61 (16 Mar 2018 04:30) (94/50 - 103/67)  BP(mean): --  RR: 18 (16 Mar 2018 04:30) (17 - 18)  SpO2: 98% (15 Mar 2018 21:20) (97% - 98%)    PHYSICAL EXAM:  GENERAL: NAD, well-groomed, well-developed  HEAD:  Atraumatic, Normocephalic  NERVOUS SYSTEM:  Alert & Oriented X3, Good concentration  CHEST/LUNG: Clear to auscultation bilaterally; No rales, rhonchi, wheezing, or rubs  HEART: Regular rate and rhythm; No murmurs, rubs, or gallops  ABDOMEN: Negative marrero sign, Soft, Nontender, Nondistended; Bowel sounds present   EXTREMITIES:  2+ Peripheral Pulses, No clubbing, cyanosis, or edema    LABS:                        12.2   7.2   )-----------( 251      ( 15 Mar 2018 07:10 )             36.1     15 Mar 2018 07:10    136    |  101    |  6.0    ----------------------------<  95     3.6     |  24.0   |  0.71     Ca    7.5        15 Mar 2018 07:10  Phos  1.9       15 Mar 2018 07:10  Mg     2.1       15 Mar 2018 07:10      PT/INR - ( 13 Mar 2018 20:49 )   PT: 16.3 sec;   INR: 1.47 ratio         PTT - ( 13 Mar 2018 20:49 )  PTT:32.2 sec  Urinalysis Basic - ( 14 Mar 2018 10:57 )    Color: Yellow / Appearance: Clear / S.010 / pH: x  Gluc: x / Ketone: Large  / Bili: Negative / Urobili: 4 mg/dL   Blood: x / Protein: 15 mg/dL / Nitrite: Negative   Leuk Esterase: Moderate / RBC: 0-2 /HPF / WBC 3-5   Sq Epi: x / Non Sq Epi: Occasional / Bacteria: Occasional      18 @ 03:02  .Urine Clean Catch (Midstream)  No growth    18 @ 16:34  .Throat Nasopharyngeal Swab    No Beta Strep group A isolated  Culture in progress     18 @ 03:02  .Urine Clean Catch (Midstream)     18 @ 16:34  .Throat Nasopharyngeal Swab      RADIOLOGY & ADDITIONAL TESTS:    CT ABDOMEN AND PELVIS WITH CONTRAST:  IMPRESSION: Fluid collection within the prostate gland with surrounding inflammatory   changes. Findings are suspicious for a prostatic abscess. Cholelithiasis and thickening of the gallbladder wall without   pericholecystic inflammation or fluid.        EXAM:  US GALLBLADDER                        PROCEDURE DATE:  2018    IMPRESSION: Multiple gallstones with borderline wall thickening although the   gallbladder is partially contracted. Negative sonographic Marre[place] sign.   Consider HIDA scan.

## 2018-03-16 NOTE — DISCHARGE NOTE ADULT - PATIENT PORTAL LINK FT
You can access the [facility] Patient Portal, offered by [facility], by registering with the following website: [email or web address]

## 2018-03-16 NOTE — PROGRESS NOTE ADULT - ASSESSMENT
26 yo male with PMH of cholelithiasis presents to the hospital due to palpitations, nonproductive cough and fever at home as high as 107 as per pt . He reported dysuria, burning with urination and 2 weeks of brown colored urine. He denies any abnormal penile discharge or lesions/plaques. He states he has sex with men, was last sexually active a month ago and uses condoms all the time. He states he has been tested for HIV in the past and it has been negative.  CT SCAN WITH PROSTATIC ABSCESS  U/S WITH GALLSTONES   CT AND MRI WTH PROSTATE ABSCESS  AS PER CHART UNABLE  TO BE DRAINED  PT DID  RECEIVE ROCEPHIN TO COVER GC WOULD RX ZITHROMAX FOR Chlamydia  PT WITH PROSTATIC ABSCESS WOULD TREAT WITH 6 WEEKS OF CIPRO 500 BID  AND REPEAT IMAGING TO SEE RESOLUTION OF ABSCESS FOLLOW UP WITH UROLOGY  WILL FOLLOW UP AS NEEDED  PLEASE CALL IF  QUESTIONS

## 2018-03-16 NOTE — PROGRESS NOTE ADULT - ASSESSMENT
28 yo homosexual male w/ PMH of Cholelithiasis presented to [facility] with sepsis picture of fever(105), tachycardia, low blood pressure and now elevated WBC. Found to have prostate fluid collection suspicious for abscess on CT A/P along with multiple non obstructing gallstones on US, which were diagnosed 2 weeks prior. Urology and ID on board, IV abx started. Pt with noticeable clinical improvement, no plan for IR guided drainage as of now. If clinically deteriorates/worsens will be transferred to [place] for drainage.  Surgical team on board with no surgical interventions planned for cholelithiasis.

## 2018-03-16 NOTE — DISCHARGE NOTE ADULT - PLAN OF CARE
Improving Pt with clinical improvement   Afebrile and WBC continues to trend down  Pt received ceftriaxone and azithro for Gonorrhea and chlamydia tc  As per ID pt to be discharged home with cipro 500 mg PO BID x 6 weeks. Pt was educated on the importance of close follow up with urology (Dr. Coyle)  as outpatient. Pt agreed to follow up and verbalized understanding.  Pt to return to ED if symptoms worsen Multiple non obstructing gallstones   Surgery consult and recommendations appreciated. No surgical intervention at this point, F/U o/p (with Dr. Willett) for possible elective cholecystectomy. Resolved   Follow up with PMD o/p for further work up Pt with clinical improvement   Afebrile and WBC continues to trend down  Pt received ceftriaxone and azithro for Gonorrhea and chlamydia tc  As per ID pt to be discharged home with cipro 500 mg PO BID x 6 weeks (UNTIL APRIL 25, 2018) and daily probiotic.   Pt was educated on the importance of close follow up with urology (Dr. Coyle)  as outpatient. Pt agreed to follow up and verbalized understanding.  Pt to return to ED if symptoms worsen ciprofloxiacin 500mg po BID x 6 weeks (until April 25, 2018)  Follow up with Dr. Licea as outpatient   Pt educated on importance of urology follow up, pt verbalizes and agrees to follow up Multiple non obstructing gallstones   No surgical intervention at this point, F/U o/p (with Dr. Willett) for possible elective cholecystectomy. Pt to follow up with Dr. Antonio as outpatient

## 2018-03-16 NOTE — DISCHARGE NOTE ADULT - HOSPITAL COURSE
28 yo homosexual male w/ PMH of Cholelithiasis presented to [facility] with sepsis picture of fever(105), tachycardia, low blood pressure and now elevated WBC. Found to have prostate fluid collection suspicious for abscess on CT A/P along with multiple non obstructing gallstones on US, which were diagnosed 2 weeks prior. Urology and ID on board, IV abx started. Pt with noticeable clinical improvement, no plan for IR guided drainage as of now. If clinically deteriorates/worsens will be transferred to [facility] for drainage.  Surgical team on board with no surgical interventions planned for cholelithiasis. Hepatitis panel and HIV negative. Awaiting GC/C results though pt prophylactially treated for GC/C with rocephin/azithro over hospital stay.  As per ID pt can be discharged home with PO abx cipro 500mg PO BID x 6 weeks with close urology (Dr. Coyle) follow up. Pt to be discharged home today. Pt educated on importance of compliance with urology follow up. Pt verbalizes understanding. Antibiotic sent to pts requested pharmacy. 28 yo homosexual male w/ PMH of Cholelithiasis presented to [facility] with sepsis picture of fever(105), tachycardia, low blood pressure and now elevated WBC. Found to have prostate fluid collection suspicious for abscess on CT A/P along with multiple non obstructing gallstones on US, which were diagnosed 2 weeks prior. Urology and ID on board, IV abx started. Pt with noticeable clinical improvement, no plan for IR guided drainage as of now. If clinically deteriorates/worsens will be transferred to [place] for drainage.  Surgical team on board with no surgical interventions planned for cholelithiasis, pt can follow up as o/p for elective cholecystectomy.  Hepatitis panel and HIV negative. Awaiting GC/C results though pt prophylactially treated for GC/C with rocephin/azithro over hospital stay.  As per ID pt can be discharged home with PO abx cipro 500mg PO BID x 6 weeks with close urology (Dr. Coyle) follow up. Pt to be discharged home today. Pt educated on importance of compliance with urology follow up. Pt verbalizes understanding. Antibiotic sent to pts requested pharmacy. Pt family reports concern for depression.. patient himself denies depression and does not seek therapy or help. Pt denies suicidal idea/homicidal ideation. 28 yo homosexual male w/ PMH of Cholelithiasis presented to [facility] with sepsis picture of fever(105), tachycardia, low blood pressure and now elevated WBC. Found to have prostate fluid collection suspicious for abscess on CT A/P along with multiple non obstructing gallstones on US, which were diagnosed 2 weeks prior. Urology and ID on board, IV abx started. Pt with noticeable clinical improvement, no plan for  drainage as of now. Dr. Coyle suggested transrectal drainage or transperineal drainage initially, but after speaking to Urologist Dr. Divya Dubois at [place] it was deemed that transurethral approach would be the best one if needed. At this point they both feel IV abx without drainage is the best approach for the pt since he is doing well clinically. If pt's clinical condition worsens then he should be transferred to [place] under the care of Dr. Divya Dubois. Surgical team on board with no surgical interventions planned for cholelithiasis, pt can follow up as o/p for elective cholecystectomy.  Hepatitis panel and HIV negative. Awaiting GC/C results though pt prophylactially treated for GC/C with rocephin/azithro over hospital stay.  As per ID pt can be discharged home with PO abx cipro 500mg PO BID x 6 weeks with close urology (Dr. Coyle) follow up. Pt to be discharged home today. Pt educated on importance of compliance with urology follow up. Pt verbalizes understanding. Antibiotic sent to pts requested pharmacy. Pt family reports concern for depression.. patient himself denies depression and does not seek therapy or help. Pt denies suicidal idea/homicidal ideation & does not want to see a psych consult at this time. Cleared by Dr. Coyle & Dr. Merritt for discharge. VSS

## 2018-03-16 NOTE — DISCHARGE NOTE ADULT - CARE PLAN
Principal Discharge DX:	Prostate abscess  Secondary Diagnosis:	Gall stones  Secondary Diagnosis:	Hypophosphatemia Principal Discharge DX:	Prostate abscess  Goal:	Improving  Assessment and plan of treatment:	Pt with clinical improvement   Afebrile and WBC continues to trend down  Pt received ceftriaxone and azithro for Gonorrhea and chlamydia tc  As per ID pt to be discharged home with cipro 500 mg PO BID x 6 weeks. Pt was educated on the importance of close follow up with urology (Dr. Coyle)  as outpatient. Pt agreed to follow up and verbalized understanding.  Pt to return to ED if symptoms worsen  Secondary Diagnosis:	Gall stones  Assessment and plan of treatment:	Multiple non obstructing gallstones   Surgery consult and recommendations appreciated. No surgical intervention at this point, F/U o/p (with Dr. Willett) for possible elective cholecystectomy.  Secondary Diagnosis:	Hypophosphatemia  Assessment and plan of treatment:	Resolved   Follow up with PMD o/p for further work up Principal Discharge DX:	Prostate abscess  Goal:	Improving  Assessment and plan of treatment:	Pt with clinical improvement   Afebrile and WBC continues to trend down  Pt received ceftriaxone and azithro for Gonorrhea and chlamydia tc  As per ID pt to be discharged home with cipro 500 mg PO BID x 6 weeks (UNTIL APRIL 25, 2018) and daily probiotic.   Pt was educated on the importance of close follow up with urology (Dr. Coyle)  as outpatient. Pt agreed to follow up and verbalized understanding.  Pt to return to ED if symptoms worsen  Secondary Diagnosis:	Gall stones  Assessment and plan of treatment:	Multiple non obstructing gallstones   Surgery consult and recommendations appreciated. No surgical intervention at this point, F/U o/p (with Dr. Willett) for possible elective cholecystectomy.  Secondary Diagnosis:	Hypophosphatemia  Assessment and plan of treatment:	Resolved   Follow up with PMD o/p for further work up Principal Discharge DX:	Prostate abscess  Goal:	Improving  Assessment and plan of treatment:	ciprofloxiacin 500mg po BID x 6 weeks (until April 25, 2018)  Follow up with Dr. Licea as outpatient   Pt educated on importance of urology follow up, pt verbalizes and agrees to follow up  Secondary Diagnosis:	Gall stones  Assessment and plan of treatment:	Multiple non obstructing gallstones   No surgical intervention at this point, F/U o/p (with Dr. Willett) for possible elective cholecystectomy.  Secondary Diagnosis:	Hypophosphatemia  Assessment and plan of treatment:	Resolved   Follow up with PMD o/p for further work up  Secondary Diagnosis:	Depression  Assessment and plan of treatment:	Pt to follow up with Dr. Antonio as outpatient

## 2018-03-16 NOTE — PROGRESS NOTE ADULT - PROBLEM SELECTOR PLAN 1
CT a/p and pelvic MRI reviewed  Urology and ID on board  Continue with IV zosyn, Ucx -ve  Day 4 IV abx  Pt with clinical improvement   Afebrile and WBC continues to trend down  Dr. Coyle suggested transrectal drainage or transperineul drainage initially, but after speaking to Urologist Dr. Divya Dubois at [place] it was deemed that transurethral approach would be the best one if needed. At this point they both feel IV abx without drainage is the best approach for the pt since he is doing well clinically. If pt's clinical condition worsens then he should be transferred to [place] under the care of Dr. Divya Dubois.   f/u Gonorrhea and chlamydia NAAT  HIV -ve CT a/p and pelvic MRI reviewed  Urology and ID on board  Continue with IV zosyn, Ucx -ve  Day 4 IV abx  Pt with clinical improvement   Afebrile and WBC continues to trend down  Dr. Coyle suggested transrectal drainage or transperineul drainage initially, but after speaking to Urologist Dr. Divya Dubois at [place] it was deemed that transurethral approach would be the best one if needed. At this point they both feel IV abx without drainage is the best approach for the pt since he is doing well clinically. If pt's clinical condition worsens then he should be transferred to [place] under the care of Dr. Divya Dubois.   f/u Gonorrhea and chlamydia NAAT  HIV -ve    Pt stable for discharge given clinical improvement. As per urology pt to be discharged home with cipro 500 mg PO BID x 6 weeks and pt to get 1 dose azithro 1gm IV prior to DC for g/c treatment. Pt was educated on the importance of close follow up with urology as outpatient. Pt agreed to follow up and verbalized understanding. CT a/p and pelvic MRI reviewed  Urology and ID on board  Continue with IV zosyn, Ucx -ve  Day 4 IV abx  Pt with clinical improvement   Afebrile and WBC continues to trend down  Dr. Coyle suggested transrectal drainage or transperineul drainage initially, but after speaking to Urologist Dr. Divya Dubois at [place] it was deemed that transurethral approach would be the best one if needed. At this point they both feel IV abx without drainage is the best approach for the pt since he is doing well clinically. If pt's clinical condition worsens then he should be transferred to [place] under the care of Dr. Divya Dubois.   f/u Gonorrhea and chlamydia NAAT  HIV -ve    Pt stable for discharge given clinical improvement. As per ID pt can discharged home with cipro 500 mg PO BID x 6 weeks and pt to get 1 dose azithro 1gm IV prior to DC for g/c treatment. Pt was educated on the importance of close follow up with urology as outpatient. Pt agreed to follow up and verbalized understanding.

## 2018-03-16 NOTE — PROGRESS NOTE ADULT - PROBLEM SELECTOR PLAN 5
US Abd: multiple gallstones with bladder wall thickening  No RUQ pain, neg [place] sign  Surgery consult and recommendations appreciated. No surgical intervention at this point, F/U o/p for possible elective cholecystectomy

## 2018-03-16 NOTE — DISCHARGE NOTE ADULT - PROVIDER TOKENS
VI:'[id number]' TOKEN:'[id number]:'[id number]'

## 2018-03-16 NOTE — PROGRESS NOTE ADULT - PROBLEM SELECTOR PLAN 2
Resolved  Likely source of sepsis, prostate abscess.  Blood cx -ve  Currently afebrile with VSS  Downtrending WBC (15,11,7,5)  Lactate normal  UA- trace leuk est, pos protein; Dysuria  CT Abd- 3.2x2.4cm fluid collection in prostate concerning for abscess  Continue with IVF 150cc/hr and IV zosyn   Tylenol PRN  Urology and ID on board, recommendations appreciated and implemented

## 2018-03-16 NOTE — DISCHARGE NOTE ADULT - CARE PROVIDER_API CALL
Gustavo Coyle), Urology  [address]  [address]  [place] [address]  Phone: [phone]  Fax: [phone]    Anoop Willett), Surgical ICU  [address] [place] [address]  Phone: [phone]  Fax: [phone] Gustavo Coyle), Urology  [address]  [address]  [place] [address]  Phone: [phone]  Fax: [phone]    Anoop Willett (), Surgical ICU  [address] [place] [address]  Phone: [phone]  Fax: [phone]    Julián Antonio), Psychiatry  [address] [place] [address]  Phone: [phone]  Fax: [phone]

## 2018-03-16 NOTE — PROGRESS NOTE ADULT - SUBJECTIVE AND OBJECTIVE BOX
KARISSA RUIZ is a 27y Male with HPI:   26 yo male with PMH of cholelithiasis presents to the hospital due to palpitations, nonproductive cough and fever at home as high as 107 as per pt . He reported dysuria, burning with urination and 2 weeks of brown colored urine. He denies any abnormal penile discharge or lesions/plaques. He states he has sex with men, was last sexually active a month ago and uses condoms all the time. He states he has been tested for HIV in the past and it has been negative.  CT SCAN WITH PROSTATIC ABSCESS  U/S WITH GALLSTONES   CT AND MRI WTH PROSTATE ABSCESS  AS PER CHART UNABLE  TO BE DRAINED      Allergies:  No Known Allergies      Medications:  acetaminophen   Tablet 650 milliGRAM(s) Oral every 6 hours PRN  azithromycin   Tablet 1000 milliGRAM(s) Oral once  ondansetron Injectable 4 milliGRAM(s) IV Push every 6 hours PRN  piperacillin/tazobactam IVPB. 3.375 Gram(s) IV Intermittent every 8 hours  saccharomyces boulardii 250 milliGRAM(s) Oral two times a day  sodium chloride 0.9%. 1000 milliLiter(s) IV Continuous <Continuous>      ANTIBIOTICS:         Review of Systems: - Negative except as mentioned above     Physical Exam:  ICU Vital Signs Last 24 Hrs  T(C): 36.7 (16 Mar 2018 04:30), Max: 36.8 (15 Mar 2018 18:30)  T(F): 98.1 (16 Mar 2018 04:30), Max: 98.2 (15 Mar 2018 18:30)  HR: 75 (16 Mar 2018 04:30) (75 - 87)  BP: 95/61 (16 Mar 2018 04:30) (95/58 - 103/67)  BP(mean): --  ABP: --  ABP(mean): --  RR: 18 (16 Mar 2018 04:30) (18 - 18)  SpO2: 98% (15 Mar 2018 21:20) (98% - 98%)    GEN: NAD, pleasant  HEENT: normocephalic and atraumatic. EOMI. CAITLYN...  NECK: Supple. No carotid bruits.  No lymphadenopathy or thyromegaly.  LUNGS: Clear to auscultation.  HEART: Regular rate and rhythm without murmur.  ABDOMEN: Soft, nontender, and nondistended.  Positive bowel sounds.  No hepatosplenomegaly was noted.  NO REBOUND NO GUARDING  EXTREMITIES: Without any cyanosis, clubbing, rash, lesions or edema.  NEUROLOGIC: Cranial nerves II through XII are grossly intact.    SKIN: No ulceration or induration present.      Labs:

## 2018-03-17 RX ORDER — CIPROFLOXACIN LACTATE 400MG/40ML
1 VIAL (ML) INTRAVENOUS
Qty: 80 | Refills: 0 | OUTPATIENT
Start: 2018-03-17 | End: 2018-04-25

## 2018-03-17 RX ORDER — CIPROFLOXACIN LACTATE 400MG/40ML
1 VIAL (ML) INTRAVENOUS
Qty: 0 | Refills: 0 | COMMUNITY
Start: 2018-03-17 | End: 2018-04-25

## 2018-03-18 LAB
CULTURE RESULTS: SIGNIFICANT CHANGE UP
CULTURE RESULTS: SIGNIFICANT CHANGE UP
SPECIMEN SOURCE: SIGNIFICANT CHANGE UP
SPECIMEN SOURCE: SIGNIFICANT CHANGE UP

## 2018-03-19 PROBLEM — K80.20 CALCULUS OF GALLBLADDER WITHOUT CHOLECYSTITIS WITHOUT OBSTRUCTION: Chronic | Status: ACTIVE | Noted: 2018-03-13

## 2018-03-20 ENCOUNTER — APPOINTMENT (OUTPATIENT)
Dept: UROLOGY | Facility: CLINIC | Age: 28
End: 2018-03-20
Payer: COMMERCIAL

## 2018-03-20 VITALS
HEART RATE: 83 BPM | RESPIRATION RATE: 16 BRPM | BODY MASS INDEX: 45.08 KG/M2 | WEIGHT: 245 LBS | SYSTOLIC BLOOD PRESSURE: 92 MMHG | DIASTOLIC BLOOD PRESSURE: 61 MMHG | HEIGHT: 62 IN | TEMPERATURE: 98.2 F

## 2018-03-20 DIAGNOSIS — Z87.19 PERSONAL HISTORY OF OTHER DISEASES OF THE DIGESTIVE SYSTEM: ICD-10-CM

## 2018-03-20 DIAGNOSIS — F15.90 OTHER STIMULANT USE, UNSPECIFIED, UNCOMPLICATED: ICD-10-CM

## 2018-03-20 DIAGNOSIS — N41.2 ABSCESS OF PROSTATE: ICD-10-CM

## 2018-03-20 LAB
BILIRUB UR QL STRIP: NORMAL
CLARITY UR: CLEAR
COLLECTION METHOD: NORMAL
GLUCOSE UR-MCNC: NORMAL
HCG UR QL: 0.2 EU/DL
HGB UR QL STRIP.AUTO: NORMAL
KETONES UR-MCNC: NORMAL
LEUKOCYTE ESTERASE UR QL STRIP: NORMAL
NITRITE UR QL STRIP: NORMAL
PH UR STRIP: 7
PROT UR STRIP-MCNC: NORMAL
SP GR UR STRIP: 1.01

## 2018-03-20 PROCEDURE — 81003 URINALYSIS AUTO W/O SCOPE: CPT | Mod: QW

## 2018-03-20 PROCEDURE — 99202 OFFICE O/P NEW SF 15 MIN: CPT

## 2018-03-20 RX ORDER — SULFAMETHOXAZOLE AND TRIMETHOPRIM 800; 160 MG/1; MG/1
800-160 TABLET ORAL
Qty: 28 | Refills: 0 | Status: ACTIVE | COMMUNITY
Start: 2018-03-20 | End: 1900-01-01

## 2018-03-20 RX ORDER — CIPROFLOXACIN HYDROCHLORIDE 500 MG/1
500 TABLET, FILM COATED ORAL
Qty: 20 | Refills: 0 | Status: DISCONTINUED | COMMUNITY
Start: 2018-03-17

## 2018-03-20 RX ORDER — SACCHAROMYCES CEREVISIAE 0.05 G/ML
INJECTION, SOLUTION PERCUTANEOUS
Refills: 0 | Status: ACTIVE | COMMUNITY

## 2018-03-20 RX ORDER — AZITHROMYCIN 250 MG/1
250 TABLET, FILM COATED ORAL
Qty: 6 | Refills: 0 | Status: ACTIVE | COMMUNITY
Start: 2018-03-20 | End: 1900-01-01

## 2018-03-22 LAB
BACTERIA UR CULT: NORMAL
C TRACH RRNA SPEC QL NAA+PROBE: NOT DETECTED
N GONORRHOEA RRNA SPEC QL NAA+PROBE: NOT DETECTED
SOURCE AMPLIFICATION: NORMAL

## 2019-06-30 NOTE — ED PROVIDER NOTE - CARE PLAN
Supportive care  One-to-one observation  Turn q 2 hours Principal Discharge DX:	Calculus of gallbladder without cholecystitis without obstruction

## 2019-11-18 NOTE — ED PROVIDER NOTE - PMH
Hospitalist at bedside.
Pt in xray and CT.
Pt unable to ambulate, cannot get himself up, and states he feels like he is going to fall.
TRANSFER - OUT REPORT: 
 
Verbal report given to Lou De La Torre (name) on Mai Vergara  being transferred to [facility] (unit) for routine progression of care Report consisted of patients Situation, Background, Assessment and  
Recommendations(SBAR). Information from the following report(s) ED Summary was reviewed with the receiving nurse. Lines:  
Venous Access Device Upper chest (subclavicular area, right (Active) Peripheral IV 11/18/19 Left; Inner Forearm (Active) Site Assessment Clean, dry, & intact 11/18/2019  6:17 PM  
Phlebitis Assessment 0 11/18/2019  6:17 PM  
Dressing Status Clean, dry, & intact 11/18/2019  6:17 PM  
  
 
Opportunity for questions and clarification was provided. Patient transported with: 
 Registered Nurse
Gall stones

## 2020-08-20 PROBLEM — Z87.19 HISTORY OF CALCULUS OF GALLBLADDER: Status: RESOLVED | Noted: 2018-03-20 | Resolved: 2020-08-20

## 2021-09-08 NOTE — ED PROVIDER NOTE - CROS ED ENMT ALL NEG
Recommend a virtual visit with me in the next couple of weeks. We can do and overall recheck and discuss the points her daughter has brought up in her recent message.   - - -

## 2022-03-29 NOTE — ED PROVIDER NOTE - CROS ED RESP ALL NEG
Refill request received for Ccelebrex. Refill sent to [facility] per LOV.     negative...

## 2022-11-13 NOTE — ED PROVIDER NOTE - NSTIMEPROVIDERCAREINITIATE_GEN_ER
LABS:                        10.7<L>  9.23  )-----------( 405<H>    ( 13 Nov 2022 00:32 )             34.4<L>    140    |  105    |  22     ----------------------------<  127<H>    13 Nov 2022 00:32  3.8     |  28     |  0.79     Ca 8.7           13 Nov 2022 00:32    Radiology:  hip and right knee xray pending
13-Mar-2018 11:29

## 2023-07-26 NOTE — ED PROVIDER NOTE - NS_ATTENDINGSCRIBE_ED_ALL_ED
What Type Of Note Output Would You Prefer (Optional)?: Standard Output
Hpi Title: Evaluation of Skin Lesions
How Severe Are Your Spot(S)?: moderate
Have Your Spot(S) Been Treated In The Past?: has not been treated
I personally performed the service described in the documentation recorded by the scribe in my presence, and it accurately and completely records my words and actions.

## 2024-04-29 NOTE — H&P ADULT - NSHPPOADEEPVENOUSTHROMB_GEN_A_CORE
Show Aperture Variable?: Yes Duration Of Freeze Thaw-Cycle (Seconds): 0 Detail Level: Detailed Render Note In Bullet Format When Appropriate: No Post-Care Instructions: I reviewed with the patient in detail post-care instructions. Patient is to wear sunprotection, and avoid picking at any of the treated lesions. Pt may apply Vaseline to crusted or scabbing areas. Consent: The patient's consent was obtained including but not limited to risks of crusting, scabbing, blistering, scarring, darker or lighter pigmentary change, recurrence, incomplete removal and infection. no

## 2025-02-13 NOTE — ED ADULT NURSE NOTE - GASTROINTESTINAL WDL
[Negative] : Heme/Lymph
[Negative] : Heme/Lymph
Abdomen soft, nontender, nondistended, bowel sounds present in all 4 quadrants.